# Patient Record
Sex: FEMALE | Race: BLACK OR AFRICAN AMERICAN | Employment: UNEMPLOYED | URBAN - METROPOLITAN AREA
[De-identification: names, ages, dates, MRNs, and addresses within clinical notes are randomized per-mention and may not be internally consistent; named-entity substitution may affect disease eponyms.]

---

## 2017-06-01 PROBLEM — K80.43 CHOLEDOCHOLITHIASIS WITH ACUTE CHOLECYSTITIS WITH OBSTRUCTION: Status: ACTIVE | Noted: 2017-06-01

## 2018-05-19 ENCOUNTER — HOSPITAL ENCOUNTER (INPATIENT)
Age: 23
LOS: 2 days | Discharge: HOME | DRG: 560 | End: 2018-05-21
Attending: OBSTETRICS & GYNECOLOGY | Admitting: OBSTETRICS & GYNECOLOGY
Payer: MEDICAID

## 2018-05-19 DIAGNOSIS — G89.18 ACUTE POST-OPERATIVE PAIN: Primary | ICD-10-CM

## 2018-05-19 PROBLEM — Z3A.40 40 WEEKS GESTATION OF PREGNANCY: Status: ACTIVE | Noted: 2018-05-19

## 2018-05-19 LAB
ABO/RH: NORMAL
AMPHETAMINE SCREEN, URINE: NOT DETECTED
ANTIBODY SCREEN: NORMAL
BARBITURATE SCREEN URINE: NOT DETECTED
BENZODIAZEPINE SCREEN, URINE: NOT DETECTED
CANNABINOID SCREEN URINE: NOT DETECTED
COCAINE METABOLITE SCREEN URINE: NOT DETECTED
HCT VFR BLD CALC: 33 % (ref 34–48)
HEMOGLOBIN: 10.3 G/DL (ref 11.5–15.5)
MCH RBC QN AUTO: 20.6 PG (ref 26–35)
MCHC RBC AUTO-ENTMCNC: 31.2 % (ref 32–34.5)
MCV RBC AUTO: 66.1 FL (ref 80–99.9)
METHADONE SCREEN, URINE: NOT DETECTED
OPIATE SCREEN URINE: NOT DETECTED
PDW BLD-RTO: 17.2 FL (ref 11.5–15)
PHENCYCLIDINE SCREEN URINE: NOT DETECTED
PLATELET # BLD: 316 E9/L (ref 130–450)
PMV BLD AUTO: 11 FL (ref 7–12)
PROPOXYPHENE SCREEN: NOT DETECTED
RBC # BLD: 4.99 E12/L (ref 3.5–5.5)
WBC # BLD: 14.4 E9/L (ref 4.5–11.5)

## 2018-05-19 PROCEDURE — 36415 COLL VENOUS BLD VENIPUNCTURE: CPT

## 2018-05-19 PROCEDURE — 6370000000 HC RX 637 (ALT 250 FOR IP): Performed by: OBSTETRICS & GYNECOLOGY

## 2018-05-19 PROCEDURE — 0UJHXZZ INSPECTION OF VAGINA AND CUL-DE-SAC, EXTERNAL APPROACH: ICD-10-PCS | Performed by: OBSTETRICS & GYNECOLOGY

## 2018-05-19 PROCEDURE — 86901 BLOOD TYPING SEROLOGIC RH(D): CPT

## 2018-05-19 PROCEDURE — 0UJMXZZ INSPECTION OF VULVA, EXTERNAL APPROACH: ICD-10-PCS | Performed by: OBSTETRICS & GYNECOLOGY

## 2018-05-19 PROCEDURE — 1220000000 HC SEMI PRIVATE OB R&B

## 2018-05-19 PROCEDURE — 1710000000 HC NURSERY LEVEL I R&B

## 2018-05-19 PROCEDURE — 86592 SYPHILIS TEST NON-TREP QUAL: CPT

## 2018-05-19 PROCEDURE — 87340 HEPATITIS B SURFACE AG IA: CPT

## 2018-05-19 PROCEDURE — 86850 RBC ANTIBODY SCREEN: CPT

## 2018-05-19 PROCEDURE — 6360000002 HC RX W HCPCS: Performed by: OBSTETRICS & GYNECOLOGY

## 2018-05-19 PROCEDURE — 86900 BLOOD TYPING SEROLOGIC ABO: CPT

## 2018-05-19 PROCEDURE — 85027 COMPLETE CBC AUTOMATED: CPT

## 2018-05-19 PROCEDURE — 7200000001 HC VAGINAL DELIVERY

## 2018-05-19 PROCEDURE — 86703 HIV-1/HIV-2 1 RESULT ANTBDY: CPT

## 2018-05-19 PROCEDURE — 6360000002 HC RX W HCPCS

## 2018-05-19 PROCEDURE — 80307 DRUG TEST PRSMV CHEM ANLYZR: CPT

## 2018-05-19 RX ORDER — ERYTHROMYCIN 5 MG/G
OINTMENT OPHTHALMIC
Status: DISPENSED
Start: 2018-05-19 | End: 2018-05-19

## 2018-05-19 RX ORDER — SODIUM CHLORIDE 0.9 % (FLUSH) 0.9 %
10 SYRINGE (ML) INJECTION EVERY 12 HOURS SCHEDULED
Status: DISCONTINUED | OUTPATIENT
Start: 2018-05-19 | End: 2018-05-19

## 2018-05-19 RX ORDER — SODIUM CHLORIDE, SODIUM LACTATE, POTASSIUM CHLORIDE, CALCIUM CHLORIDE 600; 310; 30; 20 MG/100ML; MG/100ML; MG/100ML; MG/100ML
INJECTION, SOLUTION INTRAVENOUS CONTINUOUS
Status: DISCONTINUED | OUTPATIENT
Start: 2018-05-19 | End: 2018-05-19

## 2018-05-19 RX ORDER — OXYTOCIN 10 [USP'U]/ML
10 INJECTION, SOLUTION INTRAMUSCULAR; INTRAVENOUS ONCE
Status: COMPLETED | OUTPATIENT
Start: 2018-05-19 | End: 2018-05-19

## 2018-05-19 RX ORDER — DOCUSATE SODIUM 100 MG/1
100 CAPSULE, LIQUID FILLED ORAL 2 TIMES DAILY
Status: DISCONTINUED | OUTPATIENT
Start: 2018-05-19 | End: 2018-05-21 | Stop reason: HOSPADM

## 2018-05-19 RX ORDER — ACETAMINOPHEN 325 MG/1
650 TABLET ORAL EVERY 4 HOURS PRN
Status: DISCONTINUED | OUTPATIENT
Start: 2018-05-19 | End: 2018-05-19

## 2018-05-19 RX ORDER — OXYTOCIN 10 [USP'U]/ML
INJECTION, SOLUTION INTRAMUSCULAR; INTRAVENOUS
Status: COMPLETED
Start: 2018-05-19 | End: 2018-05-19

## 2018-05-19 RX ORDER — LANOLIN 100 %
OINTMENT (GRAM) TOPICAL PRN
Status: DISCONTINUED | OUTPATIENT
Start: 2018-05-19 | End: 2018-05-21 | Stop reason: HOSPADM

## 2018-05-19 RX ORDER — PHYTONADIONE 1 MG/.5ML
INJECTION, EMULSION INTRAMUSCULAR; INTRAVENOUS; SUBCUTANEOUS
Status: DISPENSED
Start: 2018-05-19 | End: 2018-05-19

## 2018-05-19 RX ORDER — SODIUM CHLORIDE 0.9 % (FLUSH) 0.9 %
10 SYRINGE (ML) INJECTION PRN
Status: DISCONTINUED | OUTPATIENT
Start: 2018-05-19 | End: 2018-05-21 | Stop reason: HOSPADM

## 2018-05-19 RX ORDER — TRAMADOL HYDROCHLORIDE 50 MG/1
50 TABLET ORAL EVERY 6 HOURS PRN
Status: DISCONTINUED | OUTPATIENT
Start: 2018-05-19 | End: 2018-05-21 | Stop reason: HOSPADM

## 2018-05-19 RX ADMIN — BUTORPHANOL TARTRATE 2 MG: 2 INJECTION, SOLUTION INTRAMUSCULAR; INTRAVENOUS at 09:34

## 2018-05-19 RX ADMIN — OXYTOCIN 10 UNITS: 10 INJECTION, SOLUTION INTRAMUSCULAR; INTRAVENOUS at 09:15

## 2018-05-19 RX ADMIN — TRAMADOL HYDROCHLORIDE 50 MG: 50 TABLET, FILM COATED ORAL at 19:56

## 2018-05-19 RX ADMIN — DOCUSATE SODIUM 100 MG: 100 CAPSULE, LIQUID FILLED ORAL at 19:56

## 2018-05-19 RX ADMIN — TRAMADOL HYDROCHLORIDE 50 MG: 50 TABLET, FILM COATED ORAL at 13:50

## 2018-05-19 ASSESSMENT — PAIN DESCRIPTION - PAIN TYPE: TYPE: ACUTE PAIN

## 2018-05-19 ASSESSMENT — PAIN DESCRIPTION - FREQUENCY: FREQUENCY: INTERMITTENT

## 2018-05-19 ASSESSMENT — PAIN SCALES - GENERAL
PAINLEVEL_OUTOF10: 5
PAINLEVEL_OUTOF10: 8
PAINLEVEL_OUTOF10: 2
PAINLEVEL_OUTOF10: 7

## 2018-05-19 ASSESSMENT — PAIN DESCRIPTION - DESCRIPTORS: DESCRIPTORS: CRAMPING

## 2018-05-19 ASSESSMENT — PAIN DESCRIPTION - LOCATION: LOCATION: ABDOMEN

## 2018-05-19 ASSESSMENT — PAIN DESCRIPTION - ORIENTATION: ORIENTATION: MID;LOWER

## 2018-05-20 PROCEDURE — 1710000000 HC NURSERY LEVEL I R&B

## 2018-05-20 PROCEDURE — 1220000000 HC SEMI PRIVATE OB R&B

## 2018-05-20 PROCEDURE — 6370000000 HC RX 637 (ALT 250 FOR IP): Performed by: OBSTETRICS & GYNECOLOGY

## 2018-05-20 RX ORDER — TRAMADOL HYDROCHLORIDE 50 MG/1
50 TABLET ORAL EVERY 6 HOURS PRN
Qty: 24 TABLET | Refills: 0 | Status: SHIPPED | OUTPATIENT
Start: 2018-05-20 | End: 2018-05-30

## 2018-05-20 RX ADMIN — TRAMADOL HYDROCHLORIDE 50 MG: 50 TABLET, FILM COATED ORAL at 01:52

## 2018-05-20 RX ADMIN — TRAMADOL HYDROCHLORIDE 50 MG: 50 TABLET, FILM COATED ORAL at 16:19

## 2018-05-20 RX ADMIN — TRAMADOL HYDROCHLORIDE 50 MG: 50 TABLET, FILM COATED ORAL at 08:08

## 2018-05-20 RX ADMIN — DOCUSATE SODIUM 100 MG: 100 CAPSULE, LIQUID FILLED ORAL at 08:09

## 2018-05-20 RX ADMIN — DOCUSATE SODIUM 100 MG: 100 CAPSULE, LIQUID FILLED ORAL at 21:34

## 2018-05-20 RX ADMIN — TRAMADOL HYDROCHLORIDE 50 MG: 50 TABLET, FILM COATED ORAL at 22:14

## 2018-05-20 ASSESSMENT — PAIN DESCRIPTION - DESCRIPTORS
DESCRIPTORS: CRAMPING;DISCOMFORT
DESCRIPTORS: CRAMPING

## 2018-05-20 ASSESSMENT — PAIN DESCRIPTION - LOCATION
LOCATION: ABDOMEN
LOCATION: ABDOMEN

## 2018-05-20 ASSESSMENT — PAIN DESCRIPTION - PROGRESSION
CLINICAL_PROGRESSION: GRADUALLY WORSENING
CLINICAL_PROGRESSION: NOT CHANGED

## 2018-05-20 ASSESSMENT — PAIN DESCRIPTION - FREQUENCY: FREQUENCY: INTERMITTENT

## 2018-05-20 ASSESSMENT — PAIN SCALES - GENERAL
PAINLEVEL_OUTOF10: 0
PAINLEVEL_OUTOF10: 6
PAINLEVEL_OUTOF10: 6
PAINLEVEL_OUTOF10: 5
PAINLEVEL_OUTOF10: 7
PAINLEVEL_OUTOF10: 4

## 2018-05-20 ASSESSMENT — PAIN DESCRIPTION - ONSET: ONSET: GRADUAL

## 2018-05-20 ASSESSMENT — PAIN DESCRIPTION - PAIN TYPE
TYPE: ACUTE PAIN
TYPE: ACUTE PAIN

## 2018-05-20 ASSESSMENT — PAIN DESCRIPTION - ORIENTATION: ORIENTATION: LOWER;MID

## 2018-05-21 ENCOUNTER — PREP FOR PROCEDURE (OUTPATIENT)
Dept: OBGYN | Age: 23
End: 2018-05-21

## 2018-05-21 VITALS
HEART RATE: 110 BPM | BODY MASS INDEX: 33.04 KG/M2 | SYSTOLIC BLOOD PRESSURE: 122 MMHG | WEIGHT: 175 LBS | DIASTOLIC BLOOD PRESSURE: 70 MMHG | HEIGHT: 61 IN | TEMPERATURE: 98.5 F | RESPIRATION RATE: 14 BRPM

## 2018-05-21 LAB
HEPATITIS B SURFACE ANTIGEN INTERPRETATION: NORMAL
HIV-1 AND HIV-2 ANTIBODIES: NORMAL
RPR: NORMAL

## 2018-05-21 PROCEDURE — 6370000000 HC RX 637 (ALT 250 FOR IP): Performed by: OBSTETRICS & GYNECOLOGY

## 2018-05-21 RX ADMIN — DOCUSATE SODIUM 100 MG: 100 CAPSULE, LIQUID FILLED ORAL at 08:14

## 2018-05-21 RX ADMIN — TRAMADOL HYDROCHLORIDE 50 MG: 50 TABLET, FILM COATED ORAL at 10:53

## 2018-05-21 RX ADMIN — TRAMADOL HYDROCHLORIDE 50 MG: 50 TABLET, FILM COATED ORAL at 16:17

## 2018-05-21 RX ADMIN — TRAMADOL HYDROCHLORIDE 50 MG: 50 TABLET, FILM COATED ORAL at 04:35

## 2018-05-21 ASSESSMENT — PAIN SCALES - GENERAL
PAINLEVEL_OUTOF10: 8
PAINLEVEL_OUTOF10: 10
PAINLEVEL_OUTOF10: 6
PAINLEVEL_OUTOF10: 2

## 2019-01-18 ENCOUNTER — HOSPITAL ENCOUNTER (OUTPATIENT)
Age: 24
Discharge: HOME | End: 2019-01-18
Attending: OBSTETRICS & GYNECOLOGY | Admitting: OBSTETRICS & GYNECOLOGY
Payer: MEDICAID

## 2019-01-18 VITALS
BODY MASS INDEX: 30.36 KG/M2 | DIASTOLIC BLOOD PRESSURE: 55 MMHG | WEIGHT: 165 LBS | RESPIRATION RATE: 16 BRPM | TEMPERATURE: 97.5 F | HEIGHT: 62 IN | HEART RATE: 93 BPM | SYSTOLIC BLOOD PRESSURE: 112 MMHG

## 2019-01-18 PROBLEM — K80.43 CHOLEDOCHOLITHIASIS WITH ACUTE CHOLECYSTITIS WITH OBSTRUCTION: Status: RESOLVED | Noted: 2017-06-01 | Resolved: 2019-01-18

## 2019-01-18 PROBLEM — Z64.1 GRAND MULTIPARA: Status: ACTIVE | Noted: 2019-01-18

## 2019-01-18 PROBLEM — R10.9 ABDOMINAL PAIN AFFECTING PREGNANCY: Status: ACTIVE | Noted: 2019-01-18

## 2019-01-18 PROBLEM — R10.2 SUPRAPUBIC ABDOMINAL PAIN: Status: ACTIVE | Noted: 2019-01-18

## 2019-01-18 PROBLEM — Z3A.40 40 WEEKS GESTATION OF PREGNANCY: Status: RESOLVED | Noted: 2018-05-19 | Resolved: 2019-01-18

## 2019-01-18 PROBLEM — R31.21 ASYMPTOMATIC MICROSCOPIC HEMATURIA: Status: ACTIVE | Noted: 2019-01-18

## 2019-01-18 PROBLEM — O26.899 ABDOMINAL PAIN AFFECTING PREGNANCY: Status: ACTIVE | Noted: 2019-01-18

## 2019-01-18 LAB
ABO/RH: NORMAL
ANTIBODY SCREEN: NORMAL
BACTERIA: ABNORMAL /HPF
BASOPHILS ABSOLUTE: 0.04 E9/L (ref 0–0.2)
BASOPHILS RELATIVE PERCENT: 0.3 % (ref 0–2)
BILIRUBIN URINE: NEGATIVE
BLOOD, URINE: ABNORMAL
CLARITY: CLEAR
COLOR: YELLOW
EOSINOPHILS ABSOLUTE: 0.04 E9/L (ref 0.05–0.5)
EOSINOPHILS RELATIVE PERCENT: 0.3 % (ref 0–6)
FERRITIN: 10 NG/ML
GLUCOSE URINE: NEGATIVE MG/DL
HCT VFR BLD CALC: 31.6 % (ref 34–48)
HEMOGLOBIN: 10.2 G/DL (ref 11.5–15.5)
IMMATURE GRANULOCYTES #: 0.17 E9/L
IMMATURE GRANULOCYTES %: 1.3 % (ref 0–5)
KETONES, URINE: NEGATIVE MG/DL
LEUKOCYTE ESTERASE, URINE: NEGATIVE
LYMPHOCYTES ABSOLUTE: 2.32 E9/L (ref 1.5–4)
LYMPHOCYTES RELATIVE PERCENT: 17.1 % (ref 20–42)
MCH RBC QN AUTO: 22.3 PG (ref 26–35)
MCHC RBC AUTO-ENTMCNC: 32.3 % (ref 32–34.5)
MCV RBC AUTO: 69 FL (ref 80–99.9)
MONOCYTES ABSOLUTE: 0.82 E9/L (ref 0.1–0.95)
MONOCYTES RELATIVE PERCENT: 6.1 % (ref 2–12)
MUCUS: PRESENT
NEUTROPHILS ABSOLUTE: 10.15 E9/L (ref 1.8–7.3)
NEUTROPHILS RELATIVE PERCENT: 74.9 % (ref 43–80)
NITRITE, URINE: NEGATIVE
PDW BLD-RTO: 15.3 FL (ref 11.5–15)
PH UA: 6.5 (ref 5–9)
PLATELET # BLD: 318 E9/L (ref 130–450)
PMV BLD AUTO: 10.7 FL (ref 7–12)
PROTEIN UA: NEGATIVE MG/DL
RAPID HIV 1&2: NORMAL
RBC # BLD: 4.58 E12/L (ref 3.5–5.5)
RBC UA: ABNORMAL /HPF (ref 0–2)
SPECIFIC GRAVITY UA: 1.02 (ref 1–1.03)
UROBILINOGEN, URINE: 0.2 E.U./DL
WBC # BLD: 13.5 E9/L (ref 4.5–11.5)
WBC UA: ABNORMAL /HPF (ref 0–5)

## 2019-01-18 PROCEDURE — 76817 TRANSVAGINAL US OBSTETRIC: CPT | Performed by: OBSTETRICS & GYNECOLOGY

## 2019-01-18 PROCEDURE — 86900 BLOOD TYPING SEROLOGIC ABO: CPT

## 2019-01-18 PROCEDURE — 99202 OFFICE O/P NEW SF 15 MIN: CPT

## 2019-01-18 PROCEDURE — 76817 TRANSVAGINAL US OBSTETRIC: CPT

## 2019-01-18 PROCEDURE — 82728 ASSAY OF FERRITIN: CPT

## 2019-01-18 PROCEDURE — 76811 OB US DETAILED SNGL FETUS: CPT | Performed by: OBSTETRICS & GYNECOLOGY

## 2019-01-18 PROCEDURE — 86701 HIV-1ANTIBODY: CPT

## 2019-01-18 PROCEDURE — 87491 CHLMYD TRACH DNA AMP PROBE: CPT

## 2019-01-18 PROCEDURE — 87340 HEPATITIS B SURFACE AG IA: CPT

## 2019-01-18 PROCEDURE — 86901 BLOOD TYPING SEROLOGIC RH(D): CPT

## 2019-01-18 PROCEDURE — 81001 URINALYSIS AUTO W/SCOPE: CPT

## 2019-01-18 PROCEDURE — 76811 OB US DETAILED SNGL FETUS: CPT

## 2019-01-18 PROCEDURE — 86592 SYPHILIS TEST NON-TREP QUAL: CPT

## 2019-01-18 PROCEDURE — 86850 RBC ANTIBODY SCREEN: CPT

## 2019-01-18 PROCEDURE — 99243 OFF/OP CNSLTJ NEW/EST LOW 30: CPT | Performed by: OBSTETRICS & GYNECOLOGY

## 2019-01-18 PROCEDURE — 36415 COLL VENOUS BLD VENIPUNCTURE: CPT

## 2019-01-18 PROCEDURE — 99213 OFFICE O/P EST LOW 20 MIN: CPT | Performed by: NURSE PRACTITIONER

## 2019-01-18 PROCEDURE — 87591 N.GONORRHOEAE DNA AMP PROB: CPT

## 2019-01-18 PROCEDURE — 85025 COMPLETE CBC W/AUTO DIFF WBC: CPT

## 2019-01-18 PROCEDURE — 86803 HEPATITIS C AB TEST: CPT

## 2019-01-18 RX ORDER — SODIUM CHLORIDE, SODIUM LACTATE, POTASSIUM CHLORIDE, CALCIUM CHLORIDE 600; 310; 30; 20 MG/100ML; MG/100ML; MG/100ML; MG/100ML
INJECTION, SOLUTION INTRAVENOUS CONTINUOUS
Status: DISCONTINUED | OUTPATIENT
Start: 2019-01-18 | End: 2019-01-18 | Stop reason: ALTCHOICE

## 2019-01-18 RX ORDER — SODIUM CHLORIDE, SODIUM LACTATE, POTASSIUM CHLORIDE, AND CALCIUM CHLORIDE .6; .31; .03; .02 G/100ML; G/100ML; G/100ML; G/100ML
500 INJECTION, SOLUTION INTRAVENOUS ONCE
Status: DISCONTINUED | OUTPATIENT
Start: 2019-01-18 | End: 2019-01-18 | Stop reason: ALTCHOICE

## 2019-01-21 LAB
HEPATITIS B SURFACE ANTIGEN INTERPRETATION: NORMAL
HEPATITIS C ANTIBODY INTERPRETATION: NORMAL
RPR: NORMAL

## 2019-01-25 LAB
CHLAMYDIA TRACHOMATIS AMPLIFIED DET: NORMAL
N GONORRHOEAE AMPLIFIED DET: NORMAL

## 2019-03-21 ENCOUNTER — HOSPITAL ENCOUNTER (EMERGENCY)
Age: 24
Discharge: HOME | End: 2019-03-21
Payer: MEDICAID

## 2019-03-21 VITALS
DIASTOLIC BLOOD PRESSURE: 62 MMHG | TEMPERATURE: 98.4 F | OXYGEN SATURATION: 99 % | RESPIRATION RATE: 14 BRPM | WEIGHT: 168 LBS | SYSTOLIC BLOOD PRESSURE: 108 MMHG | HEIGHT: 61 IN | HEART RATE: 92 BPM | BODY MASS INDEX: 31.72 KG/M2

## 2019-03-21 DIAGNOSIS — J06.9 ACUTE UPPER RESPIRATORY INFECTION: Primary | ICD-10-CM

## 2019-03-21 LAB
BACTERIA: ABNORMAL /HPF
BILIRUBIN URINE: NEGATIVE
BLOOD, URINE: ABNORMAL
CLARITY: CLEAR
COLOR: YELLOW
EPITHELIAL CELLS, UA: ABNORMAL /HPF
GLUCOSE URINE: NEGATIVE MG/DL
INFLUENZA A BY PCR: NOT DETECTED
INFLUENZA B BY PCR: NOT DETECTED
KETONES, URINE: NEGATIVE MG/DL
LEUKOCYTE ESTERASE, URINE: ABNORMAL
NITRITE, URINE: NEGATIVE
PH UA: 6 (ref 5–9)
PROTEIN UA: NEGATIVE MG/DL
RBC UA: ABNORMAL /HPF (ref 0–2)
SPECIFIC GRAVITY UA: <=1.005 (ref 1–1.03)
UROBILINOGEN, URINE: 0.2 E.U./DL
WBC UA: ABNORMAL /HPF (ref 0–5)

## 2019-03-21 PROCEDURE — 99283 EMERGENCY DEPT VISIT LOW MDM: CPT

## 2019-03-21 PROCEDURE — 81001 URINALYSIS AUTO W/SCOPE: CPT

## 2019-03-21 PROCEDURE — 87502 INFLUENZA DNA AMP PROBE: CPT

## 2019-03-21 RX ORDER — CALCIUM CARBONATE 300MG(750)
1 TABLET,CHEWABLE ORAL DAILY
Qty: 30 TABLET | Refills: 0 | Status: SHIPPED | OUTPATIENT
Start: 2019-03-21

## 2019-03-21 ASSESSMENT — PAIN DESCRIPTION - PAIN TYPE: TYPE: ACUTE PAIN

## 2019-03-21 ASSESSMENT — PAIN DESCRIPTION - LOCATION: LOCATION: GENERALIZED

## 2019-03-21 ASSESSMENT — PAIN SCALES - GENERAL: PAINLEVEL_OUTOF10: 8

## 2019-04-29 ENCOUNTER — HOSPITAL ENCOUNTER (OUTPATIENT)
Age: 24
Setting detail: OBSERVATION
Discharge: HOME | End: 2019-04-30
Attending: OBSTETRICS & GYNECOLOGY | Admitting: OBSTETRICS & GYNECOLOGY
Payer: MEDICAID

## 2019-04-29 PROBLEM — O09.30 POOR PATIENT ATTENDANCE OF ANTENATAL CARE: Status: ACTIVE | Noted: 2019-04-29

## 2019-04-29 PROBLEM — O46.93 VAGINAL BLEEDING IN PREGNANCY, THIRD TRIMESTER: Status: ACTIVE | Noted: 2019-04-29

## 2019-04-29 LAB
ABO/RH: NORMAL
ANTIBODY SCREEN: NORMAL
HCT VFR BLD CALC: 29.7 % (ref 34–48)
HEMOGLOBIN: 9.2 G/DL (ref 11.5–15.5)
MCH RBC QN AUTO: 20 PG (ref 26–35)
MCHC RBC AUTO-ENTMCNC: 31 % (ref 32–34.5)
MCV RBC AUTO: 64.4 FL (ref 80–99.9)
PDW BLD-RTO: 17.3 FL (ref 11.5–15)
PLATELET # BLD: 262 E9/L (ref 130–450)
PMV BLD AUTO: 10.2 FL (ref 7–12)
RBC # BLD: 4.61 E12/L (ref 3.5–5.5)
WBC # BLD: 11.6 E9/L (ref 4.5–11.5)

## 2019-04-29 PROCEDURE — 86850 RBC ANTIBODY SCREEN: CPT

## 2019-04-29 PROCEDURE — 87340 HEPATITIS B SURFACE AG IA: CPT

## 2019-04-29 PROCEDURE — 87491 CHLMYD TRACH DNA AMP PROBE: CPT

## 2019-04-29 PROCEDURE — 76820 UMBILICAL ARTERY ECHO: CPT | Performed by: OBSTETRICS & GYNECOLOGY

## 2019-04-29 PROCEDURE — 36415 COLL VENOUS BLD VENIPUNCTURE: CPT

## 2019-04-29 PROCEDURE — 76819 FETAL BIOPHYS PROFIL W/O NST: CPT

## 2019-04-29 PROCEDURE — G0378 HOSPITAL OBSERVATION PER HR: HCPCS

## 2019-04-29 PROCEDURE — 76819 FETAL BIOPHYS PROFIL W/O NST: CPT | Performed by: OBSTETRICS & GYNECOLOGY

## 2019-04-29 PROCEDURE — 86901 BLOOD TYPING SEROLOGIC RH(D): CPT

## 2019-04-29 PROCEDURE — 76821 MIDDLE CEREBRAL ARTERY ECHO: CPT | Performed by: OBSTETRICS & GYNECOLOGY

## 2019-04-29 PROCEDURE — 86703 HIV-1/HIV-2 1 RESULT ANTBDY: CPT

## 2019-04-29 PROCEDURE — 76805 OB US >/= 14 WKS SNGL FETUS: CPT

## 2019-04-29 PROCEDURE — 99214 OFFICE O/P EST MOD 30 MIN: CPT | Performed by: PHYSICIAN ASSISTANT

## 2019-04-29 PROCEDURE — 86762 RUBELLA ANTIBODY: CPT

## 2019-04-29 PROCEDURE — 76820 UMBILICAL ARTERY ECHO: CPT

## 2019-04-29 PROCEDURE — 76821 MIDDLE CEREBRAL ARTERY ECHO: CPT

## 2019-04-29 PROCEDURE — 76805 OB US >/= 14 WKS SNGL FETUS: CPT | Performed by: OBSTETRICS & GYNECOLOGY

## 2019-04-29 PROCEDURE — 86900 BLOOD TYPING SEROLOGIC ABO: CPT

## 2019-04-29 PROCEDURE — 87591 N.GONORRHOEAE DNA AMP PROB: CPT

## 2019-04-29 PROCEDURE — 85027 COMPLETE CBC AUTOMATED: CPT

## 2019-04-29 PROCEDURE — 86592 SYPHILIS TEST NON-TREP QUAL: CPT

## 2019-04-29 PROCEDURE — 99213 OFFICE O/P EST LOW 20 MIN: CPT | Performed by: MIDWIFE

## 2019-04-29 PROCEDURE — 99243 OFF/OP CNSLTJ NEW/EST LOW 30: CPT | Performed by: OBSTETRICS & GYNECOLOGY

## 2019-04-29 PROCEDURE — 87081 CULTURE SCREEN ONLY: CPT

## 2019-04-29 NOTE — H&P
Department of Obstetrics and Gynecology  Physician Assistant Obstetrics History and Physical      HISTORY OF PRESENT ILLNESS:      The patient is a 21 y.o.  6 parity 5 at 42 weeks' 4 days' gestation presents to the antepartal floor c/o cramping and vaginal bleeding. She reports when she went to wipe after urinating, she had approximately a teaspoon's worth of blood. No bleeding since. She has not had any prenatal care. She denies recent sexual intercourse, trauma. Current obstetric history is significant for:  No prenatal care  5 term 's  Precipitous delivery with 5th/last pregnancy    Estimated Due Date:  2019  Contractions: Occasional  Leaking of fluid: No  Bleeding:  Yes  Perceived fetal movement: Good    Group B Strep: no prenatal care      PAST OB HISTORY:  OB History    Para Term  AB Living   6 5 5     5   SAB TAB Ectopic Molar Multiple Live Births           0 5      # Outcome Date GA Lbr Cortez/2nd Weight Sex Delivery Anes PTL Lv   6 Current            5 Term 18 39w6d  7 lb 6.5 oz (3.36 kg) F Vag-Spont None N DAVID      Complications: Precipitous Labor   4 Term 17 38w5d  7 lb 15 oz (3.6 kg) M Vag-Spont EPI N DAVID   3 Term 03/26/15     Vag-Spont  N DAVID   2 Term 14 39w0d  7 lb 8 oz (3.402 kg) M Vag-Spont EPI N DAVID   1 Term 02/17/10 39w0d  7 lb 2 oz (3.232 kg) M Vag-Spont EPI N DAVID           Pre-eclampsia:  No      :  No      D & C:  No      Cerclage:  No      LEEP:  No      Myomectomy:  No       Labor: No    Past Medical History:    Diagnosis Date    Anemia     Asthma     childhood    Mental disorder     depression and anxiety    Postpartum depression         Past Surgical History:    Procedure Laterality Date    CHOLECYSTECTOMY  2017    Dr. Ortiz Lemons    ERCP  2017    Dr. Alexander Wu        Social History:     reports that she has been smoking cigarettes. She has been smoking about 0.25 packs per day.  She has never used smokeless tobacco.

## 2019-04-29 NOTE — CONSULTS
19    RE:  Wil Rhodes  : 1995   AGE: 21 y. o.     This report has been created using voice recognition software. It may contain errors which are inherent in voice recognition technology.     Dear Dr. Gloria Pennington:     I saw your patient Gregory Carpio today for the following indications:     Patient Active Problem List   Diagnosis    Abdominal pain affecting pregnancy    No prenatal care in current pregnancy in second trimester    Suprapubic abdominal pain    Asymptomatic microscopic hematuria    Grand multipara      As you know, your patient is a 21 y.o. female, who is G6(5,0,0,5). She has an Estimated Date of Delivery: 19 based on her previous ultrasound assessment, on 2019. She stated that her last menstrual dating parameters were uncertain. She is currently 35 weeks 6 days gestation based on the previous assessment. The patient is had limited prenatal care. Her last physician evaluation was 2019 at the time of her evaluation in the labor and delivery unit.     The patient was admitted to the labor and delivery unit for evaluation and management secondary to complaint of abdominal cramping and vaginal bleeding less than a period. She is had no leaking fluid, fever, chills, dysuria or hematuria. Her fetal movement has been good. She had no history of abdominal trauma or recent sexual activity. The fetal heart rate tracing is reassuring with moderate variability and accelerations. An occasional variable deceleration was noted. Irregular uterine contractions were present. The patient has hypochromic, microcytic anemia with a low ferritin level, consistent with iron deficiency anemia. The patient is a cigarette smoker. I advised the patient of the importance of discontinuing her smoking activity. She was advised of the increased risk of  morbidity and mortality associated with this activity.  This includes, but is not limited to; the increased for  labor and delivery,  rupture of membranes, placental abruption, intrauterine growth restriction and intrauterine death. I also advised her of the significant increased risk of sudden infant death syndrome in individuals that smoke.      The patient is had 5 previous vaginal deliveries without complications. She delivered her most recent pregnancy at home The patient has had 5 previous deliveries. She is at increased risk of  morbidity and mortality secondary to grand-multiparity. She is at a significant risk of post partum hemorrhage from uterine atony.      A fetal ultrasound assessment was performed today. The estimated fetal weight is at the 61st%. The SANTO is 13.2 cm. The BPP and cord Doppler studies were both reassuring.  No apparent gross fetal anatomic abnormalities have been identified, however visualization is somewhat limited secondary to the advanced gestational age of the fetus and the fetal position.                                GENETIC SCREENING/TERATOLOGY COUNSELING                            (Includes patient, FTB, and any affected family members)     Patient Age > 35 Years NO   Thalassemia ( MVC<80) NO   Congential Heart Defect NO   Neural Tube Defect NO   Brandon-Sachs NO   Sickle Cell Disease NO   Sickle Cell Trait NO   Sickle C Disease or Trait NO   Hemophilia NO   Muscular Dystrophy NO   Cystic Fibrosis NO   Rusk Disease NO   Autism NO   Mental Retardation NO   History of Fragile X NO   Maternal Diabetes NO   Other Genetic Disease or Syndrome NO   Previous Child With Congenital Abnormality Not Listed NO   Recreational Drugs NO                                                INFECTION HISTORY          HEPATITIS IMMUNIZED:  YES    HEPATITIS INFECTION:  NO    EXPOSURE TO TB NO    GENITAL HERPES    NO    PARVOVIRUS B-19 NO    CHICKEN POX  NO    MEASLES NO    STD NO    HIV NO    OTHER RASH OR VIRAL ILLNESS SINCE LMP NO    UTI RECURRENT NO   HPV NO      OB History  Para Term  AB Living   6 5 5     5   SAB TAB Ectopic Molar Multiple Live Births           0 5       # Outcome Date GA Lbr Cortez/2nd Weight Sex Delivery Anes PTL Lv   6 Current                     5 Term 18 39w6d   7 lb 6.5 oz (3.36 kg) F Vag-Spont None N DAVID      Complications: Precipitous Labor   4 Term 17 38w5d   7 lb 15 oz (3.6 kg) M Vag-Spont EPI N DAVID   3 Term 03/26/15         Vag-Spont   N DAVID   2 Term 14 39w0d   7 lb 8 oz (3.402 kg) M Vag-Spont EPI N DAVID   1 Term 02/17/10 39w0d   7 lb 2 oz (3.232 kg) M Vag-Spont EPI N DAVID      PAST GYNECOLOGICAL  HISTORY:  Positive for abnormal pap smears. Doesn't know the grade. Negative for sexually transmitted diseases. Negative for cervical LEEP / conization /cryosurgery. Negative for uterine surgery.    Negative for ovarian or tubal surgery.      Past Medical History:   Diagnosis Date    Anemia      Asthma       childhood    Mental disorder       depression and anxiety    Postpartum depression              Past Surgical History:   Procedure Laterality Date    CHOLECYSTECTOMY   2017     Dr. Chapin Slight    ERCP   2017     Dr. Ember Barrera            Allergies   Allergen Reactions    Motrin [Ibuprofen] Hives       Pt states allergy is to over the counter only    Tylenol [Acetaminophen] Hives       Pt states allergy is to over the counter tylenol only      Current Medication   No current facility-administered medications for this encounter.         Social History   Substance Use Topics    Smoking status: Current Every Day Smoker       Packs/day: 0.25       Types: Cigarettes    Smokeless tobacco: Never Used    Alcohol use No      FAMILY MEDICAL HISTORY:   Negative for congenital abnormalities, autism, genetic disease and mental retardation, not listed above.      Review of Systems :   CONSTITUTIONAL : No fever, no chills   HEENT : No headache, no visual changes, no rhinorrhea, no sore throat   CARDIOVASCULAR : No pain, no palpitations, no edema   RESPIRATORY : No pain, no shortness of breath   GASTROINTESTINAL : No N/V, no D/C, Suprapubic abdominal pain   GENITOURINARY : No dysuria, hematuria and no incontinence   MUSCULOSKELETAL : No myalgia, No back pain  NEUROLOGICAL : No numbness, no tingling, no tremors. No history of seizures  ALL OTHER SYSTEMS WERE REPORTED AS NEGATIVE.     PERTINENT PHYSICAL EXAMINATION:   Patient Vitals for the past 24 hrs:   Temp Temp [facility]   04/29/19 1548 98.1 °F (36.7 °C) Oral      GENERAL:   The patient is a well developed, female who is alert cooperative and oriented times three in no acute distress.     HEENT:  Normo cephalic and atraumatic. No facial edema.      ABDOMEN:   Her uterus is gravid. She had no complaint of abdominal pain or tenderness. The fetal heart rate is 140 bpm. The fetus is in the cephalic presentation which was confirmed by the ultrasound assessment.     EXTREMITIES:  No peripheral edema is noted.      PELVIC EXAMINATION:  Not repeated. The original evaluation performed by Brittni Ridley,       A fetal ultrasound assessment was performed today. A report is enclosed for your review.      IMPRESSION:    1.  IUP at 35 weeks 6 days gestation based on her Estimated Date of Delivery: 5/28/19    2. Limited prenatal care    3. [address] multiparity    4. Previous ultrasound 1/18/2019. LMP dates were uncertain. 6.  Reactive NST with occasional variables. 7.  No apparent gross fetal anatomic abnormalities, with limited visualization as noted above. 8.  Elevated cord Doppler SD ratios, without absence or reversal of end-diastolic flow    9. EFW 61st% based on the initial ultrasound assessment from January 18, 2019  10. MCA Doppler SD ratio was >6 which is within normal limits for this gestational age  6. Cigarette smoker      PLAN:  Laboratory testing was ordered including a urinalysis with culture and sensitivity, and urine drug screen.     Continuous fetal heart rate and uterine contraction monitoring should be utilized for now. DVT prophylaxis should be utilized throughout her admission. The biophysical profile and cord Doppler studies will be repeated in the morning. The patient was advised of the importance of discontinuing her cigarette smoking.     Further evaluation and management will be dependent on the results of the patient's testing and her clinical presentation.      If you have any questions regarding her management, please contact me at your convenience and thank you for allowing me to participate in her care.     Sincerely,           Pawan Sheffield MD, [address], [facility], [address] [facility],  Tanisha Frost [facility]  Director [address] [place] [address]  [phone]

## 2019-04-29 NOTE — PROGRESS NOTES
-prev vag, 36w4d, ijeoma . No prenatal care. States she has called dr. Vanessa Barrett and gal to try to get appointments, but has not been able to. Presents for spotting that occurred once today at 1100 when she wiped after urinating. She states there was a small amount of red blood. Also states she has had cramping since then 6/10. No other complaints. Denies complications that she is aware of with this pregnancy and prior pregnancies.

## 2019-04-30 VITALS
TEMPERATURE: 97.7 F | HEART RATE: 82 BPM | RESPIRATION RATE: 12 BRPM | SYSTOLIC BLOOD PRESSURE: 113 MMHG | DIASTOLIC BLOOD PRESSURE: 62 MMHG

## 2019-04-30 LAB
AMPHETAMINE SCREEN, URINE: NOT DETECTED
BACTERIA: NORMAL /HPF
BARBITURATE SCREEN URINE: NOT DETECTED
BENZODIAZEPINE SCREEN, URINE: NOT DETECTED
BILIRUBIN URINE: NEGATIVE
BLOOD, URINE: NORMAL
CANNABINOID SCREEN URINE: NOT DETECTED
CLARITY: CLEAR
COCAINE METABOLITE SCREEN URINE: NOT DETECTED
COLOR: YELLOW
GLUCOSE URINE: NEGATIVE MG/DL
HEPATITIS B SURFACE ANTIGEN INTERPRETATION: NORMAL
HIV-1 AND HIV-2 ANTIBODIES: NORMAL
KETONES, URINE: NEGATIVE MG/DL
LEUKOCYTE ESTERASE, URINE: NEGATIVE
METHADONE SCREEN, URINE: NOT DETECTED
NITRITE, URINE: NEGATIVE
OPIATE SCREEN URINE: NOT DETECTED
PH UA: 6.5 (ref 5–9)
PHENCYCLIDINE SCREEN URINE: NOT DETECTED
PROPOXYPHENE SCREEN: NOT DETECTED
PROTEIN UA: NEGATIVE MG/DL
RBC UA: NORMAL /HPF (ref 0–2)
RPR: NORMAL
RUBELLA ANTIBODY IGG: NORMAL
SPECIFIC GRAVITY UA: 1.02 (ref 1–1.03)
UROBILINOGEN, URINE: 1 E.U./DL
WBC UA: NORMAL /HPF (ref 0–5)

## 2019-04-30 PROCEDURE — 76820 UMBILICAL ARTERY ECHO: CPT | Performed by: OBSTETRICS & GYNECOLOGY

## 2019-04-30 PROCEDURE — 76819 FETAL BIOPHYS PROFIL W/O NST: CPT | Performed by: OBSTETRICS & GYNECOLOGY

## 2019-04-30 PROCEDURE — 81001 URINALYSIS AUTO W/SCOPE: CPT

## 2019-04-30 PROCEDURE — 76819 FETAL BIOPHYS PROFIL W/O NST: CPT

## 2019-04-30 PROCEDURE — 80307 DRUG TEST PRSMV CHEM ANLYZR: CPT

## 2019-04-30 PROCEDURE — G0378 HOSPITAL OBSERVATION PER HR: HCPCS

## 2019-04-30 PROCEDURE — 99213 OFFICE O/P EST LOW 20 MIN: CPT | Performed by: OBSTETRICS & GYNECOLOGY

## 2019-04-30 PROCEDURE — 76820 UMBILICAL ARTERY ECHO: CPT

## 2019-04-30 PROCEDURE — 87088 URINE BACTERIA CULTURE: CPT

## 2019-04-30 NOTE — PROGRESS NOTES
SINCE LMP NO     UTI RECURRENT NO   HPV NO      OB History    Para Term  AB Living   6 5 5     5   SAB TAB Ectopic Molar Multiple Live Births           0 5       # Outcome Date GA Lbr Cortez/2nd Weight Sex Delivery Anes PTL Lv   6 Current                     5 Term 18 39w6d   7 lb 6.5 oz (3.36 kg) F Vag-Spont None N DAVID      Complications: Precipitous Labor   4 Term 17 38w5d   7 lb 15 oz (3.6 kg) M Vag-Spont EPI N DAVID   3 Term 03/26/15         Vag-Spont   N DAVID   2 Term 14 39w0d   7 lb 8 oz (3.402 kg) M Vag-Spont EPI N DAVID   1 Term 02/17/10 39w0d   7 lb 2 oz (3.232 kg) M Vag-Spont EPI N DAVID      PAST GYNECOLOGICAL  HISTORY:  Positive for abnormal pap smears. Doesn't know the grade. Negative for sexually transmitted diseases. Negative for cervical LEEP / conization /cryosurgery.    Negative for uterine surgery.    Negative for ovarian or tubal surgery.      Past Medical History:   Diagnosis Date    Anemia      Asthma       childhood    Mental disorder       depression and anxiety    Postpartum depression              Past Surgical History:   Procedure Laterality Date    CHOLECYSTECTOMY   2017     Dr. Gena Huddleston    ERCP   2017     Dr. Yasir Gonzalez            Allergies   Allergen Reactions    Motrin [Ibuprofen] Hives       Pt states allergy is to over the counter only    Tylenol [Acetaminophen] Hives       Pt states allergy is to over the counter tylenol only      Current Medication   No current facility-administered medications for this encounter.          Social History   Substance Use Topics    Smoking status: Current Every Day Smoker       Packs/day: 0.25       Types: Cigarettes    Smokeless tobacco: Never Used    Alcohol use No      FAMILY MEDICAL HISTORY:   Negative for congenital abnormalities, autism, genetic disease and mental retardation, not listed above.      Review of Systems :   CONSTITUTIONAL : No fever, no chills   HEENT : No headache, no visual changes, no rhinorrhea, no sore throat   CARDIOVASCULAR : No pain, no palpitations, no edema   RESPIRATORY : No pain, no shortness of breath   GASTROINTESTINAL : No N/V, no D/C, Suprapubic abdominal pain   GENITOURINARY : No dysuria, hematuria and no incontinence   MUSCULOSKELETAL : No myalgia, No back pain  NEUROLOGICAL : No numbness, no tingling, no tremors. No history of seizures  ALL OTHER SYSTEMS WERE REPORTED AS NEGATIVE.     PERTINENT PHYSICAL EXAMINATION:   Patient Vitals for the past 24 hrs:   BP Temp Temp src Pulse Resp   04/30/19 1237 113/62 97.7 °F (36.5 °C) Oral 82 12   04/30/19 0818 (!) 105/59 98.1 °F (36.7 °C) Oral 96 16   04/30/19 0102 107/74 -- -- 78 16   04/30/19 0001 (!) 103/48 -- -- 80 16   04/29/19 2201 (!) 102/59 -- -- 78 --   04/29/19 2100 114/66 -- -- 83 --   04/29/19 2001 115/60 -- -- 81 --     GENERAL:   The patient is a well developed, female who is alert cooperative and oriented times three in no acute distress.     HEENT:  Normo cephalic and atraumatic. No facial edema.      ABDOMEN:   Her uterus is gravid. She had no complaint of abdominal pain or tenderness. The fetal heart rate is 145 bpm. The fetus was in the cephalic presentation which was confirmed by the ultrasound assessment.     EXTREMITIES:  No peripheral edema is noted.      PELVIC EXAMINATION:  Not repeated. The original evaluation performed by Catherine Greene on admission,       A fetal ultrasound assessment was performed today. A report is enclosed for your review.     Admission on 04/29/2019   Component Date Value Ref Range Status    WBC 04/29/2019 11.6* 4.5 - 11.5 E9/L Final    RBC 04/29/2019 4.61  3.50 - 5.50 E12/L Final    Hemoglobin 04/29/2019 9.2* 11.5 - 15.5 g/dL Final    Hematocrit 04/29/2019 29.7* 34.0 - 48.0 % Final    MCV 04/29/2019 64.4* 80.0 - 99.9 fL Final    MCH 04/29/2019 20.0* 26.0 - 35.0 pg Final    MCHC 04/29/2019 31.0* 32.0 - 34.5 % Final    RDW 04/29/2019 17.3* 11.5 - 15.0 fL Final    Platelets 31/32/3740 262 130 - 450 E9/L Final    MPV 04/29/2019 10.2  7.0 - 12.0 fL Final    ABO/Rh 04/29/2019 A POS   Final    Antibody Screen 04/29/2019 NEG   Final    Hep B S Ag Interp 04/29/2019 Non-Reactive  NON REACT Final    Rubella Antibody IgG 04/29/2019 SEE BELOW  IMMUNE Final    HIV-1/HIV-2 Ab 04/29/2019 Non-Reactive  NON REACT Final    RPR 04/29/2019 NON-REACTIVE  Non-reactive Final    Color, UA 04/30/2019 Yellow  Straw/Yellow Final    Clarity, UA 04/30/2019 Clear  Clear Final    Glucose, Ur 04/30/2019 Negative  Negative mg/dL Final    Bilirubin Urine 04/30/2019 Negative  Negative Final    Ketones, Urine 04/30/2019 Negative  Negative mg/dL Final    Specific Gravity, UA 04/30/2019 1.020  1.005 - 1.030 Final    Blood, Urine 04/30/2019 TRACE-INTACT  Negative Final    pH, UA 04/30/2019 6.5  5.0 - 9.0 Final    Protein, UA 04/30/2019 Negative  Negative mg/dL Final    Urobilinogen, Urine 04/30/2019 1.0  <2.0 E.U./dL Final    Nitrite, Urine 04/30/2019 Negative  Negative Final    Leukocyte Esterase, Urine 04/30/2019 Negative  Negative Final    Amphetamine Screen, Urine 04/30/2019 NOT DETECTED  Negative <1000 ng/mL Final    Barbiturate Screen, Ur 04/30/2019 NOT DETECTED  Negative < 200 ng/mL Final    Benzodiazepine Screen, Urine 04/30/2019 NOT DETECTED  Negative < 200 ng/mL Final    Cannabinoid Scrn, Ur 04/30/2019 NOT DETECTED  Negative < 50ng/mL Final    Cocaine Metabolite Screen, Urine 04/30/2019 NOT DETECTED  Negative < 300 ng/mL Final    Opiate Scrn, Ur 04/30/2019 NOT DETECTED  Negative < 300ng/mL Final    PCP Screen, Urine 04/30/2019 NOT DETECTED  Negative < 25 ng/mL Final    Methadone Screen, Urine 04/30/2019 NOT DETECTED  Negative <300 ng/mL Final    Propoxyphene Scrn, Ur 04/30/2019 NOT DETECTED  Negative <300 ng/mL Final    WBC, UA 04/30/2019 NONE  0 - 5 /HPF Final    RBC, UA 04/30/2019 0-1  0 - 2 /HPF Final    Bacteria, UA 04/30/2019 NONE  /HPF Final       IMPRESSION:    1.  IUP at 36 weeks 0 days gestation based on her Estimated Date of Delivery: 19    2.  Limited prenatal care    3.  Grand multiparity    4.  Previous ultrasound 2019. LMP dates were uncertain.     6.  Reactive NST with occasional variables.    7.  No apparent gross fetal anatomic abnormalities, with limited visualization as noted above.    8.  Elevated cord Doppler SD ratios, without absence or reversal of end-diastolic flow    9. EFW 61st% based on the initial ultrasound assessment from 2019  10. MCA Doppler SD ratio was >6 on 2019, which is within normal limits for this gestational age  6. Cigarette smoker   12. Hypochromic microcytic anemia     PLAN:  I would recommend that the patient count fetal movements and call if she notices any subjective decrease in fetal movements, particularly if there are less than 10 major movements in an hour. Non-stress testing should be performed every 3 to 4 days through the balance of the pregnancy. Serial ultrasounds to assess fetal anatomy and growth should be performed. The patient is at increase risk for  morbidity and mortality secondary to her history. Weekly BPP and cord Doppler testing should be performed, unless there is a clinical indication to perform the testing more frequently. The patient was advised to call if she has any increased vaginal discharge, vaginal bleeding, contractions, abdominal pain, back pain or any new significant symptomatology prior to her next visit. I advised her that these are signs and symptoms of cervical change and require follow-up assessment when they occur.     The patient was advised of the importance of discontinuing her smoking and not being in the presence of others who smoke.     Further evaluation and management will be dependent on the results of the patient's testing and her clinical presentation.      If you have any questions regarding her management, please contact me at your convenience and thank you for allowing me to participate in her care.     Sincerely,           George Bustillo MD, [address], Vanessa Mueller, [address] [facility], [address] Tanish[address]  Director [address] [place] [address]  [phone]

## 2019-04-30 NOTE — PROGRESS NOTES
Pt given discharge instructions and verbalized understanding. She exited off unit ambulatory with significant other and children.

## 2019-04-30 NOTE — PROGRESS NOTES
Dr. Zonia Tafoya at bedside to see pt. Ordered pt can go home from his standpoint and she is to return to l&d on Friday for a NST and follow up with the [facility] within the next week.

## 2019-04-30 NOTE — PROGRESS NOTES
Patient back from [facility], to bathroom to wash up at the sink. Patient asking if she can eat, will ask house officer.

## 2019-04-30 NOTE — PROGRESS NOTES
Pt sitting up in bed eating lunch. D/t EFM not tracing well while sitting up, pt was taken off efm. Will place back on once she is finished.

## 2019-05-02 LAB
CHLAMYDIA TRACHOMATIS AMPLIFIED DET: NORMAL
GROUP B STREP CULTURE: NORMAL
N GONORRHOEAE AMPLIFIED DET: NORMAL
URINE CULTURE, ROUTINE: NORMAL

## 2019-05-03 PROBLEM — Z3A.37 37 WEEKS GESTATION OF PREGNANCY: Status: ACTIVE | Noted: 2019-05-03

## 2019-05-06 ENCOUNTER — HOSPITAL ENCOUNTER (OUTPATIENT)
Age: 24
Discharge: HOME | End: 2019-05-06
Attending: OBSTETRICS & GYNECOLOGY | Admitting: OBSTETRICS & GYNECOLOGY
Payer: MEDICAID

## 2019-05-06 ENCOUNTER — APPOINTMENT (OUTPATIENT)
Dept: LABOR AND DELIVERY | Age: 24
End: 2019-05-06
Payer: MEDICAID

## 2019-05-06 VITALS — DIASTOLIC BLOOD PRESSURE: 60 MMHG | HEART RATE: 85 BPM | SYSTOLIC BLOOD PRESSURE: 110 MMHG

## 2019-05-06 PROBLEM — O09.30 LATE PRENATAL CARE: Status: ACTIVE | Noted: 2019-05-06

## 2019-05-06 PROCEDURE — 59025 FETAL NON-STRESS TEST: CPT

## 2019-05-06 PROCEDURE — 59025 FETAL NON-STRESS TEST: CPT | Performed by: MIDWIFE

## 2019-05-06 NOTE — PROGRESS NOTES
Pt here for nst for late pnc and elevated dopplers. Denies vb, lof, decreased fm, ctx. No complaints.

## 2019-05-06 NOTE — PROGRESS NOTES
Pt given verbal and typed dischharge instructions, verbalizes understanding. Ambulatory upon discharge.  Next NST scheduled for Thursday 5/9 at 1500

## 2019-05-08 ENCOUNTER — HOSPITAL ENCOUNTER (OUTPATIENT)
Age: 24
Setting detail: OBSERVATION
Discharge: HOME | End: 2019-05-08
Attending: OBSTETRICS & GYNECOLOGY | Admitting: OBSTETRICS & GYNECOLOGY
Payer: MEDICAID

## 2019-05-08 VITALS
DIASTOLIC BLOOD PRESSURE: 63 MMHG | HEIGHT: 62 IN | HEART RATE: 88 BPM | SYSTOLIC BLOOD PRESSURE: 118 MMHG | BODY MASS INDEX: 29.44 KG/M2 | RESPIRATION RATE: 16 BRPM | TEMPERATURE: 98.1 F | WEIGHT: 160 LBS

## 2019-05-08 PROBLEM — Z64.1 MULTIPARITY: Status: ACTIVE | Noted: 2019-05-08

## 2019-05-08 LAB — AMNISURE ROM (POC): NEGATIVE

## 2019-05-08 PROCEDURE — 76820 UMBILICAL ARTERY ECHO: CPT

## 2019-05-08 PROCEDURE — 76816 OB US FOLLOW-UP PER FETUS: CPT

## 2019-05-08 PROCEDURE — 99243 OFF/OP CNSLTJ NEW/EST LOW 30: CPT | Performed by: OBSTETRICS & GYNECOLOGY

## 2019-05-08 PROCEDURE — 76820 UMBILICAL ARTERY ECHO: CPT | Performed by: OBSTETRICS & GYNECOLOGY

## 2019-05-08 PROCEDURE — 76819 FETAL BIOPHYS PROFIL W/O NST: CPT | Performed by: OBSTETRICS & GYNECOLOGY

## 2019-05-08 PROCEDURE — 84112 EVAL AMNIOTIC FLUID PROTEIN: CPT

## 2019-05-08 PROCEDURE — G0378 HOSPITAL OBSERVATION PER HR: HCPCS

## 2019-05-08 PROCEDURE — 76819 FETAL BIOPHYS PROFIL W/O NST: CPT

## 2019-05-08 PROCEDURE — 76816 OB US FOLLOW-UP PER FETUS: CPT | Performed by: OBSTETRICS & GYNECOLOGY

## 2019-05-08 NOTE — H&P
Department of Obstetrics and Gynecology  Attending Obstetrics History and Physical      HISTORY OF PRESENT ILLNESS:      The patient is a 21 y.o.  6 parity 5 at 37 weeks 1 days. Complaining of leaking of fluid. amnisure neg. Elevated cord dopplers on 19. Limited prenatal care. Estimated Due Date:  19  Contractions:  no  nfm  Blleeding:  No    PRENATAL CARE:    Complications: limited care      Active Problems:    * No active hospital problems. *  Resolved Problems:    * No resolved hospital problems. *        PAST OB HISTORY    OB History    Para Term  AB Living   6 5 5     5   SAB TAB Ectopic Molar Multiple Live Births           0 5      # Outcome Date GA Lbr Cortez/2nd Weight Sex Delivery Anes PTL Lv   6 Current            5 Term 18 39w6d  7 lb 6.5 oz (3.36 kg) F Vag-Spont None N DAVID      Complications: Precipitous Labor   4 Term 17 38w5d  7 lb 15 oz (3.6 kg) M Vag-Spont EPI N DAVID   3 Term 03/26/15     Vag-Spont  N DAVID   2 Term 14 39w0d  7 lb 8 oz (3.402 kg) M Vag-Spont EPI N DAVID   1 Term 02/17/10 39w0d  7 lb 2 oz (3.232 kg) M Vag-Spont EPI N DAVID           Pre-eclampsia:  No      :  No      D & C:  No      Cerclage:  No      LEEP:  No      Myomectomy:  No       Labor: No    Past Medical History:    Past Medical History:   Diagnosis Date    Anemia     Asthma     childhood    Mental disorder     depression and anxiety    Postpartum depression         Past Surgical History:    Past Surgical History:   Procedure Laterality Date    CHOLECYSTECTOMY  2017    Dr. Mary Sol    ERCP  2017    Dr. Norlene Seip        Past Family History:  Family History   Problem Relation Age of Onset    Kidney Disease Father        ROS:  Const: No fever, chills, night sweats, no recent unexplained weight gain/loss  HEENT: No blurred vision, double vision; no ear problems; no sore throat, congestion; no running nose.   Resp: No cough, no sputum, no pleuritic chest pain, AM

## 2019-05-08 NOTE — PROGRESS NOTES
Discharge instructions given, patient provided signature as written understanding. Patient has an appt on 5/15 at the clinic, and phone number given for MFM and patient instructed to call for NST and appt on Monday or Tuesday, and patient verbalized understanding.

## 2019-05-08 NOTE — PROGRESS NOTES
Spoke with Dr. Kirti Marie regarding Dr. Funmi Casillas recommendation that patient can go home, discharge order received.

## 2019-05-08 NOTE — CONSULTS
RE:  RUBY Shi  PNN:    AGE: 23 y. o.     This report has been created using voice recognition software. It may contain errors which are inherent in voice recognition technology.     Dear Doctor:     I saw your Klarissa Session for the following indications:     Patient Active Problem List   Diagnosis    Abdominal pain affecting pregnancy    No prenatal care in current pregnancy in second trimester    Suprapubic abdominal pain    Asymptomatic microscopic hematuria    Grand multipara      As you know, your patient is a 23 y.o. female, who is G6(5,0,0,5). She has an Estimated Date of Delivery: 19 based on her previous ultrasound assessment, on 2019. She stated that her last menstrual dating parameters were uncertain.  Jenny Babb is currently 37 weeks 1 days gestation based on the previous assessment.      The patient is had limited prenatal care. She was admitted to the labor and delivery unit for evaluation and management secondary to complaint of  leaking amniotic fluid. She was evaluated by Dr. Mao Santiago on admission. There was no evidence of rupture of membranes. Her Amnisure assessment was negative. She stated she has had no leaking fluid since admission.     The fetal heart rate tracing is reassuring with moderate variability and accelerations.       The patient has hypochromic, microcytic anemia with a low ferritin level, consistent with iron deficiency anemia.     The patient is a cigarette smoker. I advised the patient of the importance of discontinuing her smoking activity. She was advised of the increased risk of  morbidity and mortality associated with this activity. This includes, but is not limited to; the increased for  labor and delivery,  rupture of membranes, placental abruption, intrauterine growth restriction and intrauterine death.  I also advised her of the significant increased risk of sudden infant death syndrome in individuals that smoke.      The patient is had 5 previous vaginal deliveries without complications.  She delivered her most recent pregnancy at home The patient has had 5 previous deliveries. She is at increased risk of  morbidity and mortality secondary to grand-multiparity. She is at a significant risk of post partum hemorrhage from uterine atony.      A fetal ultrasound assessment was performed today. The estimated fetal weight is at the 52nd%. The SANTO is 11 cm. The BPP and cord Doppler studies were both reassuring. There was no evidence of absence or reversal of end-diastolic flow.   The fetus was in the cephalic presentation.                                GENETIC SCREENING/TERATOLOGY COUNSELING                            (Includes patient, FTB, and any affected family members)     Patient Age > 34 Years NO   Thalassemia ( MVC<80) NO   Congential Heart Defect NO   Neural Tube Defect NO   Brandon-Sachs NO   Sickle Cell Disease NO   Sickle Cell Trait NO   Sickle C Disease or Trait NO   Hemophilia NO   Muscular Dystrophy NO   Cystic Fibrosis NO   Terri Disease NO   Autism NO   Mental Retardation NO   History of Fragile X NO   Maternal Diabetes NO   Other Genetic Disease or Syndrome NO   Previous Child With Congenital Abnormality Not Listed NO   Recreational Drugs NO                                                INFECTION HISTORY          HEPATITIS IMMUNIZED:  YES     HEPATITIS INFECTION:  NO     EXPOSURE TO TB NO     GENITAL HERPES    NO     PARVOVIRUS B-19 NO     CHICKEN POX  NO     MEASLES NO     STD NO     HIV NO     OTHER RASH OR VIRAL ILLNESS SINCE LMP NO     UTI RECURRENT NO   HPV NO      OB History    Para Term  AB Living   6 5 5     5   SAB TAB Ectopic Molar Multiple Live Births           0 5       # Outcome Date GA Lbr Cortez/2nd Weight Sex Delivery Anes PTL Lv   6 Current                     5 Term 18 39w6d   7 lb 6.5 oz (3.36 kg) F Vag-Spont None N DAVID      Complications: Precipitous Labor   4 Term 03/11/17 38w5d   7 lb 15 oz (3.6 kg) M Vag-Spont EPI N DAVID   3 Term 03/26/15         Vag-Spont   N DAVID   2 Term 08/25/14 39w0d   7 lb 8 oz (3.402 kg) M Vag-Spont EPI N DAVID   1 Term 02/17/10 39w0d   7 lb 2 oz (3.232 kg) M Vag-Spont EPI N DAVID      PAST GYNECOLOGICAL  HISTORY:  Positive for abnormal pap smears. Doesn't know the grade. Negative for sexually transmitted diseases. Negative for cervical LEEP / conization /cryosurgery.    Negative for uterine surgery. Negative for ovarian or tubal surgery.      Past Medical History:   Diagnosis Date    Anemia      Asthma       childhood    Mental disorder       depression and anxiety    Postpartum depression              Past Surgical History:   Procedure Laterality Date    CHOLECYSTECTOMY   06/01/2017     Dr. Jessica Soares    ERCP   06/02/2017     Dr. Patrica Jaime            Allergies   Allergen Reactions    Motrin [Ibuprofen] Hives       Pt states allergy is to over the counter only    Tylenol [Acetaminophen] Hives       Pt states allergy is to over the counter tylenol only      Current Medication   No current facility-administered medications for this encounter.          Social History   Substance Use Topics    Smoking status: Current Every Day Smoker       Packs/day: 0.25       Types: Cigarettes    Smokeless tobacco: Never Used    Alcohol use No      FAMILY MEDICAL HISTORY:   Negative for congenital abnormalities, autism, genetic disease and mental retardation, not listed above.      Review of Systems :   CONSTITUTIONAL : No fever, no chills   HEENT : No headache, no visual changes, no rhinorrhea, no sore throat   CARDIOVASCULAR : No pain, no palpitations, no edema   RESPIRATORY : No pain, no shortness of breath   GASTROINTESTINAL : No N/V, no D/C, Suprapubic abdominal pain   GENITOURINARY : No dysuria, hematuria and no incontinence   MUSCULOSKELETAL : No myalgia, No back pain  NEUROLOGICAL : No numbness, no tingling, no tremors.  No history of seizures  ALL OTHER SYSTEMS WERE REPORTED AS NEGATIVE.     PERTINENT PHYSICAL EXAMINATION:   Patient Vitals for the past 24 hrs:   BP Temp Temp src Pulse Resp Height Weight   05/08/19 1515 118/63 98.1 °F (36.7 °C) Oral 88 16 -- --   05/08/19 0758 (!) 106/56 98.3 °F (36.8 °C) Oral 82 18 -- --   05/08/19 0524 125/76 98.5 °F (36.9 °C) Oral 102 18 5' 2\" (1.575 m) 160 lb (72.6 kg)      GENERAL:   The patient is a well developed, female who is alert cooperative and oriented times three in no acute distress.     HEENT:  Normo cephalic and atraumatic. No facial edema.      ABDOMEN:   Her uterus is gravid. She had no complaint of abdominal pain or tenderness. The fetal heart rate is 125 bpm. The fetus is in the cephalic presentation which was confirmed by the ultrasound assessment.     EXTREMITIES:  No peripheral edema is noted.      PELVIC EXAMINATION:  Not repeated. Performed by Dr. Daysi Lewis on admission. The cervix was reported as closed, long, -3 station, medium consistency and posterior position.        A fetal ultrasound assessment was performed today. A report is enclosed for your review.      IMPRESSION:    1.  IUP at [facility] 1 days gestation based on her Estimated Date of Delivery: 5/28/19    2.  Limited prenatal care    3.  Grand multiparity    4.  Previous ultrasound 1/18/2019. LMP dates were uncertain.     6.  Reactive NST   Marissa.Murders.  No apparent gross fetal anatomic abnormalities    8.  Reassuring biophysical profile and cord Doppler testing    9. EFW 52nd%   10. Cigarette smoker   11. Membranes not confirmed as ruptured.      PLAN:  I would recommend that the patient count fetal movements and call if she notices any subjective decrease in fetal movements, particularly if there are less than 10 major movements in an hour. Non-stress testing should be performed every 3 to 4 days through the balance of the pregnancy. Serial ultrasounds to assess fetal anatomy and growth should be performed.  The patient is at increase risk for  morbidity and mortality secondary to her history. Weekly BPP and cord Doppler testing should be performed, unless there is a clinical indication to perform the testing more frequently. The patient was advised to call if she has any increased vaginal discharge, vaginal bleeding, contractions, abdominal pain, back pain or any new significant symptomatology prior to her next visit. I advised her that these are signs and symptoms of cervical change and require follow-up assessment when they occur.     The patient is to continue to follow in the prenatal clinic for ongoing evaluation and management.     The patient was advised of the importance of discontinuing her cigarette smoking.     Further evaluation and management will be dependent on the results of the patient's testing and her clinical presentation.      If you have any questions regarding her management, please contact me at your convenience and thank you for allowing me to participate in her care.     Sincerely,           Heladio Orourke MD, [address], [address], [address] [facility], [address] [place] [address] Michael, [facility]  Director [address] [place] [address]  [phone]

## 2019-05-08 NOTE — PROGRESS NOTES
Assumed pt care at 07:30. Denies feeling ctx's, no VB/LOF. States +FM. Awaiting MFM to see pt. Assessment completed, call light within reach.

## 2019-05-17 ENCOUNTER — INITIAL PRENATAL (OUTPATIENT)
Dept: OBGYN | Age: 24
End: 2019-05-17
Payer: MEDICAID

## 2019-05-17 VITALS
BODY MASS INDEX: 32.19 KG/M2 | HEART RATE: 113 BPM | DIASTOLIC BLOOD PRESSURE: 60 MMHG | WEIGHT: 176 LBS | SYSTOLIC BLOOD PRESSURE: 107 MMHG

## 2019-05-17 DIAGNOSIS — Z91.199 MEDICAL NON-COMPLIANCE: ICD-10-CM

## 2019-05-17 DIAGNOSIS — Z34.93 PRENATAL CARE IN THIRD TRIMESTER: Primary | ICD-10-CM

## 2019-05-17 DIAGNOSIS — O09.33 LIMITED PRENATAL CARE IN THIRD TRIMESTER: ICD-10-CM

## 2019-05-17 DIAGNOSIS — Z64.1 GRAND MULTIPARITY: ICD-10-CM

## 2019-05-17 LAB
GLUCOSE URINE, POC: NEGATIVE
PROTEIN UA: ABNORMAL

## 2019-05-17 PROCEDURE — G8417 CALC BMI ABV UP PARAM F/U: HCPCS | Performed by: OBSTETRICS & GYNECOLOGY

## 2019-05-17 PROCEDURE — 99202 OFFICE O/P NEW SF 15 MIN: CPT | Performed by: OBSTETRICS & GYNECOLOGY

## 2019-05-17 PROCEDURE — G8427 DOCREV CUR MEDS BY ELIG CLIN: HCPCS | Performed by: OBSTETRICS & GYNECOLOGY

## 2019-05-17 PROCEDURE — 81002 URINALYSIS NONAUTO W/O SCOPE: CPT | Performed by: OBSTETRICS & GYNECOLOGY

## 2019-05-17 PROCEDURE — 59025 FETAL NON-STRESS TEST: CPT | Performed by: OBSTETRICS & GYNECOLOGY

## 2019-05-17 PROCEDURE — 4004F PT TOBACCO SCREEN RCVD TLK: CPT | Performed by: OBSTETRICS & GYNECOLOGY

## 2019-05-17 PROCEDURE — 99203 OFFICE O/P NEW LOW 30 MIN: CPT | Performed by: OBSTETRICS & GYNECOLOGY

## 2019-05-17 NOTE — PROGRESS NOTES
URBANO-S called [facility] to schedule patient for counseling. Secured appointment for patient on 5/24 at 2:00PM. SW called client and notified of appointment date and time and provided agency contact information if needed. Will f/u with her at next appointment in [facility].

## 2019-05-17 NOTE — PROGRESS NOTES
MOHIT present to complete prenatal registration assessment. Patient presents to our clinic for the first time and reports minimal prenatal care. She is alone at appointment. Reports she was a previous patient of  and attempted to resume care with her but was unable to do so but also stated she was previously with  and . When questioned why she did not obtain prenatal care reports because she never heard back from [facility] so decided to just not follow through. Social Hx/Supports:  Patient reports she has five other children ages 7,2,1,4,3 and that she lives with all of her children as well as FOB that she identifies as Erin Puente who is the father of all her children except the oldest. She reports Darrel Joy is currently unemployed but was previously working at [employer]. Patient reports she is on maternity leave but was previously working at [employer].   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 She reports she is supposed to be trying to get him into [facility] for counseling but has not done so as of yet. She reports prior cases were a few years ago when her son walked to school and the school called but she reports not knowing that he did this and the case was closed after a few weeks. She also reports positive UDS for marijuana with previous births causing CPS involvement. Psychiatric Hx/Involvement/Sx:   Patient reports a hx of depression and anxiety and previously being tx at [facility] a few months ago but has not returned. She also reports she was prescribed zoloft by Dr. Ghazala Wolfe but that she did not feel this was helpful. She did endorse positive benefits from counseling but unsure why she decided not to return. Did agree to reinitiate counseling services. Denies any current SI/HI plan/intent.  One previous suicide attempt when a teenager where she reports taking a bunch of different medications including tylenol. Patient reports positive for mental health throughout maternal s/o the family but has no information regarding fathers side. Reports marijuana use. Initially denied use for awhile but then disclosed last use was last month. Denied any other AoD use/abuse. Resources:  Patient reports that she receives $718/month in food stamps and $790/month in cash assistance. Reports that all of her children receive pediatric care of the [address] [place] [address]. She reports she was linked with [facility] but has not returned contact in awhile reporting she needs to follow up with them regarding a car seat and bed. Also reports she was supposed to linked with [facility] but never responded to this either. Notified of alternative pregnancy resources within the community to utilize. Impression/Plan:   There are concerns regarding lack of prenatal care and reports of depression, minimal follow through with supportive services. Discussed this with patient. Agreed to reinitiate counseling services with [facility]. LISW-S will schedule and notify patient of appointment information. Discussed early warning signs and completed verbal safety planning. Provided SW contact information regarding further questions/concenrs. Will continue to follow.

## 2019-05-17 NOTE — PROGRESS NOTES
Temp 98.3  Here today for initial prenatal visit at 39.5 weeks  No prenatal care. Pt was at [facility] and had GBS and GC/CT cultures done on 19  Denies lof, vb. States ora hopkins  States +fm  , all   Urine dipstick done  Assisted Dr. Javier Fowler with pelvic exam, ft dilated. Patient placed on NST 4921-5047 reactive per him  130, moderate variability, accels, no decels. Patient informed she will be delivered at [facility] by house officer. Pt verbalized understanding  URBANO Martins saw patient. See her note. 2 proof of pregnancy given to patient. Patient scheduled for nst Tuesday at 10 am at [facility]. Scheduled with Otto Galindo. Pt informed. Discharge instructions given to patient by Dr. Javier Fowler. Pt verbalized understanding.

## 2019-05-21 ENCOUNTER — HOSPITAL ENCOUNTER (INPATIENT)
Age: 24
LOS: 2 days | Discharge: HOME | DRG: 560 | End: 2019-05-23
Attending: OBSTETRICS & GYNECOLOGY | Admitting: OBSTETRICS & GYNECOLOGY
Payer: MEDICAID

## 2019-05-21 ENCOUNTER — ANESTHESIA EVENT (OUTPATIENT)
Dept: LABOR AND DELIVERY | Age: 24
DRG: 560 | End: 2019-05-21
Payer: MEDICAID

## 2019-05-21 ENCOUNTER — ANESTHESIA (OUTPATIENT)
Dept: LABOR AND DELIVERY | Age: 24
DRG: 560 | End: 2019-05-21
Payer: MEDICAID

## 2019-05-21 PROBLEM — Z3A.37 37 WEEKS GESTATION OF PREGNANCY: Status: RESOLVED | Noted: 2019-05-03 | Resolved: 2019-05-21

## 2019-05-21 PROBLEM — O99.019 IRON DEFICIENCY ANEMIA OF PREGNANCY: Status: ACTIVE | Noted: 2019-05-21

## 2019-05-21 PROBLEM — D50.9 IRON DEFICIENCY ANEMIA OF PREGNANCY: Status: ACTIVE | Noted: 2019-05-21

## 2019-05-21 PROBLEM — Z3A.39 39 WEEKS GESTATION OF PREGNANCY: Status: ACTIVE | Noted: 2019-05-21

## 2019-05-21 PROBLEM — Z64.1 MULTIPARITY: Status: RESOLVED | Noted: 2019-05-08 | Resolved: 2019-05-21

## 2019-05-21 LAB
ABO/RH: NORMAL
AMPHETAMINE SCREEN, URINE: NOT DETECTED
ANTIBODY SCREEN: NORMAL
BARBITURATE SCREEN URINE: NOT DETECTED
BENZODIAZEPINE SCREEN, URINE: NOT DETECTED
CANNABINOID SCREEN URINE: NOT DETECTED
COCAINE METABOLITE SCREEN URINE: NOT DETECTED
HCT VFR BLD CALC: 34.8 % (ref 34–48)
HEMOGLOBIN: 10.6 G/DL (ref 11.5–15.5)
KLEIHAUER BETKE: NORMAL
MCH RBC QN AUTO: 19.4 PG (ref 26–35)
MCHC RBC AUTO-ENTMCNC: 30.5 % (ref 32–34.5)
MCV RBC AUTO: 63.7 FL (ref 80–99.9)
METHADONE SCREEN, URINE: NOT DETECTED
OPIATE SCREEN URINE: NOT DETECTED
PDW BLD-RTO: 19.6 FL (ref 11.5–15)
PHENCYCLIDINE SCREEN URINE: NOT DETECTED
PLATELET # BLD: 276 E9/L (ref 130–450)
PMV BLD AUTO: ABNORMAL FL (ref 7–12)
PROPOXYPHENE SCREEN: NOT DETECTED
RBC # BLD: 5.46 E12/L (ref 3.5–5.5)
WBC # BLD: 12.2 E9/L (ref 4.5–11.5)

## 2019-05-21 PROCEDURE — 3700000025 EPIDURAL BLOCK: Performed by: ANESTHESIOLOGY

## 2019-05-21 PROCEDURE — 6360000002 HC RX W HCPCS

## 2019-05-21 PROCEDURE — 86900 BLOOD TYPING SEROLOGIC ABO: CPT

## 2019-05-21 PROCEDURE — 51701 INSERT BLADDER CATHETER: CPT

## 2019-05-21 PROCEDURE — 85460 HEMOGLOBIN FETAL: CPT

## 2019-05-21 PROCEDURE — 36415 COLL VENOUS BLD VENIPUNCTURE: CPT

## 2019-05-21 PROCEDURE — G0379 DIRECT REFER HOSPITAL OBSERV: HCPCS

## 2019-05-21 PROCEDURE — 2580000003 HC RX 258: Performed by: NURSE PRACTITIONER

## 2019-05-21 PROCEDURE — 86901 BLOOD TYPING SEROLOGIC RH(D): CPT

## 2019-05-21 PROCEDURE — G0378 HOSPITAL OBSERVATION PER HR: HCPCS

## 2019-05-21 PROCEDURE — 86850 RBC ANTIBODY SCREEN: CPT

## 2019-05-21 PROCEDURE — 6360000002 HC RX W HCPCS: Performed by: OBSTETRICS & GYNECOLOGY

## 2019-05-21 PROCEDURE — 80307 DRUG TEST PRSMV CHEM ANLYZR: CPT

## 2019-05-21 PROCEDURE — 6360000002 HC RX W HCPCS: Performed by: ANESTHESIOLOGY

## 2019-05-21 PROCEDURE — 1220000001 HC SEMI PRIVATE L&D R&B

## 2019-05-21 PROCEDURE — 85027 COMPLETE CBC AUTOMATED: CPT

## 2019-05-21 PROCEDURE — 2500000003 HC RX 250 WO HCPCS: Performed by: ANESTHESIOLOGY

## 2019-05-21 RX ORDER — LIDOCAINE HYDROCHLORIDE 10 MG/ML
INJECTION, SOLUTION INFILTRATION; PERINEURAL
Status: DISCONTINUED
Start: 2019-05-21 | End: 2019-05-22 | Stop reason: WASHOUT

## 2019-05-21 RX ORDER — ONDANSETRON 2 MG/ML
4 INJECTION INTRAMUSCULAR; INTRAVENOUS EVERY 6 HOURS PRN
Status: DISCONTINUED | OUTPATIENT
Start: 2019-05-21 | End: 2019-05-22

## 2019-05-21 RX ORDER — EPHEDRINE SULFATE/0.9% NACL/PF 50 MG/5 ML
SYRINGE (ML) INTRAVENOUS
Status: DISCONTINUED
Start: 2019-05-21 | End: 2019-05-22

## 2019-05-21 RX ORDER — NALOXONE HYDROCHLORIDE 0.4 MG/ML
0.4 INJECTION, SOLUTION INTRAMUSCULAR; INTRAVENOUS; SUBCUTANEOUS PRN
Status: DISCONTINUED | OUTPATIENT
Start: 2019-05-21 | End: 2019-05-22

## 2019-05-21 RX ORDER — DIPHENHYDRAMINE HYDROCHLORIDE 50 MG/ML
25 INJECTION INTRAMUSCULAR; INTRAVENOUS EVERY 6 HOURS PRN
Status: DISCONTINUED | OUTPATIENT
Start: 2019-05-21 | End: 2019-05-22

## 2019-05-21 RX ORDER — ACETAMINOPHEN 650 MG
TABLET, EXTENDED RELEASE ORAL
Status: COMPLETED
Start: 2019-05-21 | End: 2019-05-22

## 2019-05-21 RX ORDER — EPHEDRINE SULFATE 50 MG/ML
10 INJECTION, SOLUTION INTRAVENOUS PRN
Status: DISCONTINUED | OUTPATIENT
Start: 2019-05-21 | End: 2019-05-22

## 2019-05-21 RX ORDER — NALBUPHINE HCL 10 MG/ML
5 AMPUL (ML) INJECTION EVERY 4 HOURS PRN
Status: DISCONTINUED | OUTPATIENT
Start: 2019-05-21 | End: 2019-05-22

## 2019-05-21 RX ORDER — SODIUM CHLORIDE, SODIUM LACTATE, POTASSIUM CHLORIDE, CALCIUM CHLORIDE 600; 310; 30; 20 MG/100ML; MG/100ML; MG/100ML; MG/100ML
INJECTION, SOLUTION INTRAVENOUS CONTINUOUS
Status: DISCONTINUED | OUTPATIENT
Start: 2019-05-21 | End: 2019-05-22

## 2019-05-21 RX ADMIN — Medication 1 MILLI-UNITS/MIN: at 17:50

## 2019-05-21 RX ADMIN — SODIUM CHLORIDE, POTASSIUM CHLORIDE, SODIUM LACTATE AND CALCIUM CHLORIDE: 600; 310; 30; 20 INJECTION, SOLUTION INTRAVENOUS at 20:55

## 2019-05-21 RX ADMIN — Medication 15 ML/HR: at 17:30

## 2019-05-21 RX ADMIN — Medication 10 ML: at 21:00

## 2019-05-21 RX ADMIN — EPHEDRINE SULFATE 10 MG: 50 INJECTION INTRAMUSCULAR; INTRAVENOUS; SUBCUTANEOUS at 21:11

## 2019-05-21 RX ADMIN — DIPHENHYDRAMINE HYDROCHLORIDE 25 MG: 50 INJECTION, SOLUTION INTRAMUSCULAR; INTRAVENOUS at 19:56

## 2019-05-21 RX ADMIN — NALBUPHINE HYDROCHLORIDE 5 MG: 10 INJECTION, SOLUTION INTRAMUSCULAR; INTRAVENOUS; SUBCUTANEOUS at 18:25

## 2019-05-21 RX ADMIN — SODIUM CHLORIDE, POTASSIUM CHLORIDE, SODIUM LACTATE AND CALCIUM CHLORIDE: 600; 310; 30; 20 INJECTION, SOLUTION INTRAVENOUS at 17:00

## 2019-05-21 RX ADMIN — Medication 10 ML: at 17:30

## 2019-05-21 ASSESSMENT — LIFESTYLE VARIABLES: SMOKING_STATUS: 1

## 2019-05-21 ASSESSMENT — PAIN SCALES - GENERAL: PAINLEVEL_OUTOF10: 0

## 2019-05-21 NOTE — PROGRESS NOTES
Contractions are every  2-6 min. FH: 120/min with accelerations and moderate variability. Cervix: 4 cm/75-80%/Vx/-2    Plan: Epidural  Pit augmentation.

## 2019-05-21 NOTE — PROGRESS NOTES
L&D PROGRESS NOTE:    Patient:  Yazan Vázquez Date:  2019  9:49 AM  Medical Record Number:  [id number]   Today's Date: 2019    S: Dr Emily Ocampo MD requesting AROM. O:   Vitals:    19 1907 19 1910 19 1923   BP:    132/65   Pulse:    65   Resp:    16   Temp:    98.7 °F (37.1 °C)   TempSrc:    Oral   SpO2: 100% 99% 99%    Weight:       Height:         FHR:  Reassuring. Cervix: 4 cm / 70% / soft / -1. TOCO:  2 minutes-8 minutes. A:23 y.o. AA female at 38w11d [id number]  Pitocin augmentation for late and limited PNC  BS=9 on admission  GBS negative    Patient Active Problem List   Diagnosis    Grand multipara    Limited prenatal care in third trimester    Poor patient attendance of  care    Late prenatal care    39 weeks gestation of pregnancy    Iron deficiency anemia of pregnancy     Fetal status: Reassuring  GBS: negative    P: AROM at 0750 without difficulty using the [place]. Ruptured clear fluid. IV bolus/O2/position changes prn    Jerene Severe, M.D.  FACOG  2019 7:56 PM

## 2019-05-21 NOTE — PROGRESS NOTES
Pt seen and examined by BRUCE Paiz CNP. NST is reactive however pt is 3cm dilated. Will admit to L&D for labor per BRUCE Paiz CNP/Dr.St Alma Rosa Amos.

## 2019-05-21 NOTE — ANESTHESIA PRE PROCEDURE
Department of Anesthesiology  Preprocedure Note       Name:  Jarrod Sethi   Age:  21 y.o.  :  1995                                          MRN:  [id number]         Date:  2019      Surgeon: * No surgeons listed *    Procedure: Labor Analgesia    Medications prior to admission:   Prior to Admission medications    Medication Sig Start Date End Date Taking? Authorizing Provider   Prenatal MV-Min-FA-Omega-3 (PRENATAL GUMMIES/DHA & FA) 0.4-32.5 MG CHEW Take 1 tablet by mouth daily 3/21/19  Yes DINA Hill CNP       Current medications:    Current Facility-Administered Medications   Medication Dose Route Frequency Provider Last Rate Last Dose    lactated ringers infusion   Intravenous Continuous DINA Fox CNP        ondansetron (ZOFRAN) injection 4 mg  4 mg Intravenous Q6H PRN DINA Fox CNP        povidone-iodine (BETADINE) 10 % external solution             lidocaine 1 % injection             oxytocin (PITOCIN) 30 units in 500 mL infusion Override Pull             oxytocin (PITOCIN) 30 units in 500 mL infusion  1 nitin-units/min Intravenous Continuous Rodrigo Oh MD           Allergies:     Allergies   Allergen Reactions    Motrin [Ibuprofen] Hives     Pt states allergy is to over the counter only    Tylenol [Acetaminophen] Hives     Pt states allergy is to over the counter tylenol only       Problem List:    Patient Active Problem List   Diagnosis Code    [address] multipara Z64.1    Limited prenatal care in third trimester O09.33    Poor patient attendance of  care O09.30    37 weeks gestation of pregnancy Z3A.37    Late prenatal care O09.30    Multiparity Z64.1    39 weeks gestation of pregnancy Z3A.39       Past Medical History:        Diagnosis Date    Anemia     Asthma     childhood    Mental disorder     depression and anxiety    Postpartum depression        Past Surgical History:        Procedure Laterality Date    CHOLECYSTECTOMY  06/01/2017    Dr. Campos Felton ERCP  06/02/2017    Dr. Ginny Romero       Social History:    Social History     Tobacco Use    Smoking status: Current Every Day Smoker     Packs/day: 0.25     Types: Cigarettes    Smokeless tobacco: Never Used   Substance Use Topics    Alcohol use: No                                Ready to quit: Not Answered  Counseling given: Not Answered      Vital Signs (Current):   Vitals:    05/21/19 1027 05/21/19 1139 05/21/19 1145 05/21/19 1514   BP: (!) 109/58 114/65     Pulse: 95 83     Resp: 14 16     Temp: 36.7 °C (98 °F) 37.2 °C (99 °F)  37.1 °C (98.7 °F)   TempSrc: Oral Oral     Weight:   173 lb (78.5 kg)    Height:   5' 1\" (1.549 m)                                               BP Readings from Last 3 Encounters:   05/21/19 114/65   05/17/19 107/60   05/08/19 118/63       NPO Status:  Last solid consumption 5/21/18 @ 0830                         Last liquid consumption 5/21/18 @ 1400                                                      BMI:   Wt Readings from Last 3 Encounters:   05/21/19 173 lb (78.5 kg)   05/17/19 176 lb (79.8 kg)   05/08/19 160 lb (72.6 kg)     Body mass index is 32.69 kg/m². CBC:   Lab Results   Component Value Date    WBC 12.2 05/21/2019    RBC 5.46 05/21/2019    HGB 10.6 05/21/2019    HCT 34.8 05/21/2019    MCV 63.7 05/21/2019    RDW 19.6 05/21/2019     05/21/2019       CMP:   Lab Results   Component Value Date     10/03/2017    K 5.7 10/03/2017     10/03/2017    CO2 26 10/03/2017    BUN 8 10/03/2017    CREATININE 0.7 10/03/2017    GFRAA >60 10/03/2017    LABGLOM >60 10/03/2017    GLUCOSE 103 10/03/2017    PROT 7.3 09/21/2017    CALCIUM 8.6 10/03/2017    BILITOT 0.4 09/21/2017    ALKPHOS 62 09/21/2017    AST 21 09/21/2017    ALT 12 09/21/2017       POC Tests: No results for input(s): POCGLU, POCNA, POCK, POCCL, POCBUN, POCHEMO, POCHCT in the last 72 hours.     Coags:   Lab Results   Component Value Date    PROTIME 12.0 06/01/2017 INR 1.1 06/01/2017       HCG (If Applicable):   Lab Results   Component Value Date    PREGTESTUR positive 09/21/2017        ABGs: No results found for: PHART, PO2ART, XVA0JEW, TNH3VAA, BEART, H7PHMTBJ     Type & Screen (If Applicable):  No results found for: LABABO, [address]    Anesthesia Evaluation  Patient summary reviewed and Nursing notes reviewed no history of anesthetic complications:   Airway: Mallampati: II  TM distance: >3 FB   Neck ROM: full  Mouth opening: > = 3 FB Dental: normal exam     Comment: Pt denies any loose, chipped, or missing teeth. Pulmonary: breath sounds clear to auscultation  (+) asthma (pt stated she had asthma as a child, denies any recent issues or inhaler usage): current smoker (pt states to smoke 5 cigarettes per day)          Patient smoked on day of surgery. Cardiovascular:Negative CV ROS  Exercise tolerance: good (>4 METS),           Rhythm: regular  Rate: normal           Beta Blocker:  Not on Beta Blocker         Neuro/Psych:   (+) depression/anxiety             GI/Hepatic/Renal:   (+) GERD (pt states with pregnancy, controlled with TUMs): well controlled,           Endo/Other:    (+) blood dyscrasia (H & H 10.6 & 34.8 on 5/21): anemia:., .                 Abdominal:           Vascular: negative vascular ROS. Anesthesia Plan      general, spinal and epidural     ASA 3     (Discussed epidural and spinal risks/benefits, as well as GA as needed for back up. Questions answered. Pt states an understanding)        Anesthetic plan and risks discussed with patient. Use of blood products discussed with patient whom consented to blood products. Plan discussed with CRNA and attending.                   Cosme Wilhelm RN SRNA  5/21/2019

## 2019-05-21 NOTE — H&P
Department of Obstetrics and Gynecology  Nurse Practitioner Obstetrics History and Physical        CHIEF COMPLAINT:  Contractions and Scheduled NST    HISTORY OF PRESENT ILLNESS:    The patient is a 21 y.o. female [id number], Patient's last menstrual period was 2018 (approximate). ,  at 39w5d. Pt is a grand multipara with limited prenatal care, she was first seen in L&D at 42 weeks. She states she saw Dr. Livia Vargas in prior pregnancy but had difficulty getting appointments scheduled. She was seen by MFM and found to have elevated cord dopplers, which have since resolved with normal doppler testing. She was then instructed to follow up with the Clinic, however her first visit was on 19. Pt denies lof, vb, or decreased fm. She has been feeling ctx for the past 2 weeks and feel they have intensified.  She states she was not dilated on her last cervical exam.  OB History        6    Para   5    Term   5            AB        Living   5       SAB        TAB        Ectopic        Molar        Multiple   0    Live Births   5                Estimated Due Date: Estimated Date of Delivery: 19    PRENATAL CARE:  Limited prenatal care, grand multipara    Complicated by:   Patient Active Problem List   Diagnosis Code    Grand multipara Z64.1    Limited prenatal care in third trimester O09.33    Poor patient attendance of  care O09.30    37 weeks gestation of pregnancy Z3A.37    Late prenatal care O09.30    Multiparity Z64.1       PAST OB HISTORY  OB History        6    Para   5    Term   5            AB        Living   5       SAB        TAB        Ectopic        Molar        Multiple   0    Live Births   5                Past Medical History:        Diagnosis Date    Anemia     Asthma     childhood    Mental disorder     depression and anxiety    Postpartum depression      Past Surgical History:        Procedure Laterality Date    CHOLECYSTECTOMY  2017    Dr. Esther Forde  ERCP  06/02/2017    Dr. Patrica Jaime     Social History:    TOBACCO:   reports that she has been smoking cigarettes. She has been smoking about 0.25 packs per day. She has never used smokeless tobacco.  ETOH:   reports that she does not drink alcohol. DRUGS:   reports that she does not use drugs. Family History:       Problem Relation Age of Onset    Kidney Disease Father      Medications Prior to Admission:  Medications Prior to Admission: guaiFENesin (ROBITUSSIN) 100 MG/5ML liquid, Take 10 mLs by mouth 3 times daily as needed for Cough or Congestion  Prenatal MV-Min-FA-Omega-3 (PRENATAL GUMMIES/DHA & FA) 0.4-32.5 MG CHEW, Take 1 tablet by mouth daily  Allergies:  Motrin [ibuprofen] and Tylenol [acetaminophen]    Review of Systems:   Ears, nose, mouth, throat, and face: negative  Respiratory: negative  Cardiovascular: negative  Gastrointestinal: negative  Genitourinary:negative  Integument/breast: negative  Hematologic/lymphatic: negative  Musculoskeletal:negative  Neurological: negative  Behavioral/Psych: negative  Endocrine: negative  Allergic/Immunologic: negative  Psychosocial: negative    PHYSICAL EXAM:    General appearance:  awake, alert, cooperative, no apparent distress, and appears stated age  Neurologic:  Awake, alert, oriented to name, place and time. Cranial nerves II-XII are grossly intact.     Lungs:  clear to auscultation bilaterally  Heart:  regular rate and rhythm  Abdomen:   soft, non-distended, non-tender, no masses palpated, gravid  Fetal heart rate:  Baseline Heart Rate 130, accelerations:  present, decels:none  Pelvis:  External Genitalia: no lesions  Cervix:  DILATION:  3 cm  EFFACEMENT:   80%  STATION:  -2  CONSISTENCY:  soft    Contraction frequency:  3-6 minutes  Membranes:  Intact    BP (!) 109/58   Pulse 95   Temp 98 °F (36.7 °C) (Oral)   Resp 14   LMP 08/12/2018 (Approximate)                 Prenatal Labs  Blood Type/Rh: A pos  Antibody Screen: negative  Hemoglobin, Hematocrit, Platelets: 9.2 / 09.3 / 262 (4/29/2019)  Rubella: immune  T.  Pallidum, IgG: negative   Hepatitis B Surface Antigen: negative   HIV: non-reactive   Gonorrhea: negative  Chlamydia: negative  Group B Strep: negative        ASSESSMENT AND PLAN:  D/w Dr. Carly Anderson  Routine induction orders        Electronically signed by DINA Jaquez CNP on 5/21/2019 at 11:21 AM

## 2019-05-21 NOTE — ANESTHESIA PROCEDURE NOTES
Epidural Block    Patient location during procedure: OB  Reason for block: labor epidural  Staffing  Anesthesiologist: Tin Garcia MD  Resident/CRNA: Dereje Garcia APRN - CRNA  Other anesthesia staff: Ghulam Zabala RN  Performed: other anesthesia staff and resident/CRNA   Preanesthetic Checklist  Completed: patient identified, site marked, surgical consent, pre-op evaluation, timeout performed, IV checked, risks and benefits discussed, monitors and equipment checked, anesthesia consent given, oxygen available and patient being monitored  Epidural  Patient position: sitting  Prep: ChloraPrep  Patient monitoring: cardiac monitor, continuous pulse ox and frequent blood pressure checks  Approach: midline  Location: lumbar (1-5)  Injection technique: BENJAMIN air and BENJAMIN saline  Provider prep: mask and sterile gloves  Needle  Needle type: Tuohy   Needle gauge: 18 G  Needle length: 3.5 in  Needle insertion depth: 7 cm  Catheter type: end hole  Catheter size: 20 G.   Catheter at skin depth: 10 cm  Test dose: negative  Assessment  Hemodynamics: stable  Attempts: 2

## 2019-05-22 PROCEDURE — 6370000000 HC RX 637 (ALT 250 FOR IP): Performed by: OBSTETRICS & GYNECOLOGY

## 2019-05-22 PROCEDURE — 51701 INSERT BLADDER CATHETER: CPT

## 2019-05-22 PROCEDURE — 10907ZC DRAINAGE OF AMNIOTIC FLUID, THERAPEUTIC FROM PRODUCTS OF CONCEPTION, VIA NATURAL OR ARTIFICIAL OPENING: ICD-10-PCS | Performed by: OBSTETRICS & GYNECOLOGY

## 2019-05-22 PROCEDURE — 1220000000 HC SEMI PRIVATE OB R&B

## 2019-05-22 PROCEDURE — 6360000002 HC RX W HCPCS: Performed by: OBSTETRICS & GYNECOLOGY

## 2019-05-22 PROCEDURE — 7200000001 HC VAGINAL DELIVERY

## 2019-05-22 PROCEDURE — 2580000003 HC RX 258: Performed by: NURSE PRACTITIONER

## 2019-05-22 PROCEDURE — 3E033VJ INTRODUCTION OF OTHER HORMONE INTO PERIPHERAL VEIN, PERCUTANEOUS APPROACH: ICD-10-PCS | Performed by: OBSTETRICS & GYNECOLOGY

## 2019-05-22 RX ORDER — KETOROLAC TROMETHAMINE 30 MG/ML
30 INJECTION, SOLUTION INTRAMUSCULAR; INTRAVENOUS ONCE
Status: COMPLETED | OUTPATIENT
Start: 2019-05-22 | End: 2019-05-22

## 2019-05-22 RX ORDER — CALCIUM CARBONATE 300MG(750)
1 TABLET,CHEWABLE ORAL DAILY
Status: DISCONTINUED | OUTPATIENT
Start: 2019-05-22 | End: 2019-05-23 | Stop reason: HOSPADM

## 2019-05-22 RX ORDER — FERROUS SULFATE 325(65) MG
325 TABLET ORAL 2 TIMES DAILY WITH MEALS
Status: DISCONTINUED | OUTPATIENT
Start: 2019-05-22 | End: 2019-05-23 | Stop reason: HOSPADM

## 2019-05-22 RX ORDER — LANOLIN 100 %
OINTMENT (GRAM) TOPICAL PRN
Status: DISCONTINUED | OUTPATIENT
Start: 2019-05-22 | End: 2019-05-23 | Stop reason: HOSPADM

## 2019-05-22 RX ORDER — DOCUSATE SODIUM 100 MG/1
100 CAPSULE, LIQUID FILLED ORAL 2 TIMES DAILY
Status: DISCONTINUED | OUTPATIENT
Start: 2019-05-22 | End: 2019-05-23 | Stop reason: HOSPADM

## 2019-05-22 RX ADMIN — DOCUSATE SODIUM 100 MG: 100 CAPSULE, LIQUID FILLED ORAL at 08:57

## 2019-05-22 RX ADMIN — KETOROLAC TROMETHAMINE 30 MG: 30 INJECTION, SOLUTION INTRAMUSCULAR; INTRAVENOUS at 08:56

## 2019-05-22 RX ADMIN — SODIUM CHLORIDE, POTASSIUM CHLORIDE, SODIUM LACTATE AND CALCIUM CHLORIDE: 600; 310; 30; 20 INJECTION, SOLUTION INTRAVENOUS at 02:18

## 2019-05-22 RX ADMIN — Medication: at 04:00

## 2019-05-22 RX ADMIN — KETOROLAC TROMETHAMINE 30 MG: 30 INJECTION, SOLUTION INTRAMUSCULAR; INTRAVENOUS at 15:47

## 2019-05-22 RX ADMIN — DOCUSATE SODIUM 100 MG: 100 CAPSULE, LIQUID FILLED ORAL at 23:58

## 2019-05-22 ASSESSMENT — PAIN SCALES - GENERAL
PAINLEVEL_OUTOF10: 0
PAINLEVEL_OUTOF10: 7
PAINLEVEL_OUTOF10: 7

## 2019-05-22 NOTE — PROGRESS NOTES
RN and CRNA at bedside, IV fluid bolus, patient supine. Some VDs from patient crying in fetal position.

## 2019-05-22 NOTE — PROGRESS NOTES
House officer aware of decels which resolved, epidural bolus, maternal hypotension, and pitocin stoppped. No new orders at this time.

## 2019-05-22 NOTE — PROGRESS NOTES
Mecca Duff was ordered Prenatal MV-Min-FA-Omega-3 (PRENATAL GUMMIES/DHA & FA) which is a nonformulary medication. The patient has indicated that the home supply of this medication will be brought in to the hospital for inpatient use. If the medication has not been administered by 1400 on the following day from the time the order was placed, a pharmacist will follow-up with the nurse of the patient to assess the capability of the patient to bring in the medication. If it is determined that the patient cannot supply the medication and it is not available to be dispensed from the pharmacy, a call will be placed to the ordering provider to discuss alternative options.     Ken Gracia, [facility]  5/22/2019  5:25 AM

## 2019-05-22 NOTE — LACTATION NOTE
Pt sleeping when I entered the room for rounds and she requested I come back tomorrow. Did request elec breast pump for home use to increase supply.

## 2019-05-22 NOTE — PROGRESS NOTES
Patient up to BR with assistance x 1. Patient voided. Laura area cleaned and new pads, ice, panties, and new gown applied.

## 2019-05-22 NOTE — CARE COORDINATION
SW Discharge Planning   SW received consult due to limited prenatal care and [facility] usage during pregnancy     SW met with Carolina Barron ( 11/19/95) ( [phone]) mother to baby Margo Jurado ( 5/22/19). Isaías Barronkarandenny reported father to be Shakila Valentine ( 8/6/96) and reported that she resides with him, her son from a previous marriage,  Inder Verdin 9, and their children together, Shakila Valentine !! Age 3, Jaradlubnakaity Marco A age 1, Lilly Figueroai 2, and Smith Joe age 3. Isaías Martinez reported that both she and Kisha Nagy are currently unemployed and that baby will be added to their [employer] Isaías Martinez reported having all needed infant items for baby including a bassinet and car seat. NELLY discussed Renee's limited prenatal care. Per Isaías Martinez, she attempted multiple times to be seen in Dr. Qasim Butler office, however stated that they never returned her calls. Isaías Martinez reported that she felt over whelmed caring for the other children, and was considering adoption of this baby. Isaías Martinez reported that Dr. Kyung Bautista will follow up with pediatric care. Isaías Martinez denied any current or past history of domestic violence or legal history. Isaías Martinez did report having anxiety and depression and reported that she recently made a counseling appointment with [facility]. Per Isaías Martinez, [facility] hs been previously involved, once when Jefm  left the house and walked to school by himself at age 10, and when Ej Clemons was born due to [facility] usage during pregnancy. Tish Garzon stated that she could not recall her last usage, and stated understanding for he need for a [facility] referral.     NELLY completed [facility] referral to Clifford Gowers in intake ( [phone]).     PLAN    Baby can NOT be discharged home until [facility] provides disposition  SW to continue communication with [facility] ( [phone])     Electronically signed by Adelfo Mejias [facility] on 5/22/2019 at 4:08 PM

## 2019-05-22 NOTE — L&D DELIVERY NOTE
Department of Obstetrics and Gynecology  Spontaneous Vaginal Delivery Note    Patient:  Carlito Wong     Admit Date:  2019  9:49 AM  Medical Record Number:  [id number]   Procedure Date: 2019 4:24 AM     Pre-delivery Diagnosis:  IUP at 39 weeks 6 days, induction of labor for late, limited prenatal care  Patient Active Problem List   Diagnosis    Grand multipara    Limited prenatal care in third trimester    Poor patient attendance of  care    Late prenatal care    39 weeks gestation of pregnancy    Iron deficiency anemia of pregnancy     Post-delivery Diagnosis:  Living  infant(s) and Female  Patient Active Problem List   Diagnosis    Grand multipara    Limited prenatal care in third trimester    Poor patient attendance of  care    Late prenatal care    39 weeks gestation of pregnancy    Iron deficiency anemia of pregnancy     (normal spontaneous vaginal delivery)    Term birth of female      Information for the patient's :  Maddie Hankinsour Pending  Jose Sauers [[id number]]   Normal spontaneous vaginal delivery - uncomplicated    Delivering clinician: Dr. Alyssa Hunter:   Information for the patient's :  Alma Rosaadam Lunsford Pending  Renee [[id number]]   6 lbs. 15 oz.   3150 grams     APGARS:     Information for the patient's :  Maddie Lunsford Pending  Jose Sauers [[id number]]   1 minute: 9  5 minute: 9    Anesthesia:  epidural anesthesia    Application and Delivery:  21 y.o. AA female [id number] at 39w6d admitted for induction with a Bucio score of 9 secondary to late and limited prenatal care who progressed to complete with Pitocin and amniotomy and delivered Cephalic, right occiput anterior presentation via  @ 0407, under epidural anesthesia anesthesia,  over an intact perineum without a resulting laceration, without dystocia or complication, a Live Born Female infant, weighing 6# 15oz, 3150 grams, Clear fluid at delivery, bulb suctioned on perineum. A nuchal cord was not present. Spontaneous cry,  Apgar's 1 minute:  9; 5 minute:  9. The placenta was delivered with gentle traction and was noted to be intact, whole and that the umbilical cord had three vessels noted. Episiotomy was not needed. Repair not necessary. Cervix, rectum, sphincter intact. Sponge, needle, and instrument counts correct x 2.     Delivery Summary:  Mother's Information    Labor Events     labor?:  No  Rupture type:  Artificial=AROM, Intact  Fluid color:  Clear     Mother Delivery Information    Episiotomy:  None  Lacerations:  None  Repair Suture:  None  Vaginal Delivery Est. Blood Loss (mL):  150  Surgical or Additional Est. Blood Loss (mL):  0 (View Only):  Edit in Flowsheets   Combined Est. Blood Loss (mL):  150        Johanthan Mahmood [[id number]]    Labor Events     labor?:  No  Cervical ripening date/time:     Antibiotics received during labor?:  No  Rupture date/time: 19 19:50:00   Rupture type:  Artificial=AROM, Intact  Fluid color:  Clear  Fluid odor:  None  Induction:  Oxytocin  Augmentation:  AROM  Indications for augmentation:  Ineffective Contraction Pattern  Labor complications:  None          Labor Event Times    Labor onset date/time: 19 2100   Dilation complete date/time:   190   Start pushin2019 0400      Anesthesia    Method:  Epidural     Assisted Delivery Details    Forceps attempted?:  No  Vacuum extractor attempted?:  No     Shoulder Dystocia    Shoulder dystocia present?:  No      Presentation    Presentation:  Vertex  Position:  Right  _:  Occiput  _:  Anterior     Gilmer Information    Head delivery date/time:  2019 04:07:00   Changing the 's delivery date/time could affect patient care.:     Delivery date/time:   19 0407   Delivery type:  Vaginal, Spontaneous     Delivery Providers    Delivering clinician:  Arie Hodgkin, MD   Provider Role    Carson Trejo RN Delivery Nurse    Leatha Lucas, RN Registered Nurse      Cord    Vessels:  3 Vessels  Complications:  None  Delayed cord clamping?:  Yes  Cord clamped date/time:  2019 0408  Cord blood disposition:  Lab  Gases sent?:  Yes  Stem cell collection (by provider): No     Placenta    Date/time:  2019 04:13:00  Removal:  Spontaneous  Appearance:  Intact  Disposition:  Refrigerator     Delivery Resuscitation    Method:  Bulb Suction, Stimulation     Apgars    Living status:  Living  Apgars   1 Minute:   5 Minute:   10 Minute 15 Minute 20 Minute   Skin Color: 1  1       Heart Rate: 2  2       Reflex Irritability: 2  2       Muscle Tone: 2  2       Respiratory Effort: 2  2       Total: 9  9               Apgars Assigned By:  Shabana Hairston     Skin to Skin    Skin to skin initiation date/time: 19 04:08:00   Skin to skin with:   Mother  Skin to skin end date/time: 19 04:18:00       Measurements    Weight:  3147 g Length:  50.8 cm   Head circumference:  32 cm     Security tag #:  [id number]      Delivery Information    Episiotomy:  None  Perineal lacerations:  None    Vaginal laceration:  No    Cervical laceration:  No    Vaginal delivery est. blood loss (mL):  150  Surgical or additional est. blood loss (mL):  0 (View Only):  Edit in Flowsheets   Combined est. blood loss (mL):  150  Repair suture:  None     Vaginal Delivery Counts    Initial count personnel:  MARIBETH HORTA   4x4:   Needles:   Instruments:   Lap Pads:   Sponges:     Initial counts:          Final counts:          Final count personnel:  Candido Alex  Final count verified by:  MARIBETH  Accurate final count?:  Yes  Final vaginal sweep completed:  Yes     Other Procedures    Procedures:  None     Labor Length    1st stage:  6h 50m  2nd stage:  0h 17m  3rd stage:  0h 06m    Specimen:  None      Estimated blood loss:         Condition:  infant stable to general nursery and mother stable to maternity    Blood Type and Rh: A POS    Rubella Immunity Status:   Immune Infant Feeding:    both breast and bottle    Attending Attestation: I performed the procedure.     Alicia Hui MD, [address] [facility]  5/22/2019 4:24 AM

## 2019-05-22 NOTE — ANESTHESIA POSTPROCEDURE EVALUATION
Department of Anesthesiology  Postprocedure Note    Patient: Tamika Mohamud  MRN: [id number]  YOB: 1995  Date of evaluation: 5/22/2019  Time:  10:06 AM     Procedure Summary     Date:  05/21/19 Room / Location:      Anesthesia Start:  1702 Anesthesia Stop:  05/22/19 0407    Procedure:  Labor Analgesia Diagnosis:      Scheduled Providers:   Responsible Provider:  Jailene Torres MD    Anesthesia Type:  general, spinal, epidural ASA Status:  3          Anesthesia Type: general, spinal, epidural    Anabell Phase I: Anabell Score: 10    Anabell Phase II:      Last vitals: Reviewed and per EMR flowsheets.        Anesthesia Post Evaluation    Patient location during evaluation: bedside  Patient participation: complete - patient participated  Level of consciousness: awake  Pain score: 1  Airway patency: patent  Nausea & Vomiting: no nausea and no vomiting  Complications: no  Cardiovascular status: hemodynamically stable  Respiratory status: acceptable  Hydration status: euvolemic

## 2019-05-23 VITALS
WEIGHT: 173 LBS | DIASTOLIC BLOOD PRESSURE: 66 MMHG | HEIGHT: 61 IN | OXYGEN SATURATION: 99 % | RESPIRATION RATE: 16 BRPM | SYSTOLIC BLOOD PRESSURE: 109 MMHG | BODY MASS INDEX: 32.66 KG/M2 | TEMPERATURE: 97.7 F | HEART RATE: 76 BPM

## 2019-05-23 PROBLEM — O99.019 IRON DEFICIENCY ANEMIA OF PREGNANCY: Status: RESOLVED | Noted: 2019-05-21 | Resolved: 2019-05-23

## 2019-05-23 PROBLEM — D50.9 IRON DEFICIENCY ANEMIA OF PREGNANCY: Status: RESOLVED | Noted: 2019-05-21 | Resolved: 2019-05-23

## 2019-05-23 PROBLEM — Z3A.39 39 WEEKS GESTATION OF PREGNANCY: Status: RESOLVED | Noted: 2019-05-21 | Resolved: 2019-05-23

## 2019-05-23 PROBLEM — O09.30 POOR PATIENT ATTENDANCE OF ANTENATAL CARE: Status: RESOLVED | Noted: 2019-04-29 | Resolved: 2019-05-23

## 2019-05-23 PROBLEM — O09.30 LATE PRENATAL CARE: Status: RESOLVED | Noted: 2019-05-06 | Resolved: 2019-05-23

## 2019-05-23 PROCEDURE — 6370000000 HC RX 637 (ALT 250 FOR IP): Performed by: OBSTETRICS & GYNECOLOGY

## 2019-05-23 RX ORDER — KETOROLAC TROMETHAMINE 10 MG/1
10 TABLET, FILM COATED ORAL EVERY 6 HOURS PRN
Status: COMPLETED | OUTPATIENT
Start: 2019-05-23 | End: 2019-05-23

## 2019-05-23 RX ADMIN — DOCUSATE SODIUM 100 MG: 100 CAPSULE, LIQUID FILLED ORAL at 09:11

## 2019-05-23 RX ADMIN — KETOROLAC TROMETHAMINE 10 MG: 10 TABLET, FILM COATED ORAL at 01:09

## 2019-05-23 RX ADMIN — KETOROLAC TROMETHAMINE 10 MG: 10 TABLET, FILM COATED ORAL at 09:12

## 2019-05-23 ASSESSMENT — PAIN SCALES - GENERAL
PAINLEVEL_OUTOF10: 8
PAINLEVEL_OUTOF10: 7

## 2019-05-23 NOTE — PROGRESS NOTES
Dr Sara Simmons notified of pt feeling pain in \"uterus\" 9/10. Requesting pain medicine. Dr said pt is to discharge home.

## 2019-05-23 NOTE — DISCHARGE SUMMARY
Obstetrical Discharge Form    Primary OB Clinician: Carline Marr  Postpartum  Day #1    Gestational Age at time of delivery: 45.10    Date of Delivery: 19    Delivery Type: spontaneous vaginal delivery    Baby: Liveborn female,     Intrapartum complications: None    Laceration: none    Episiotomy: none,    Feeding method: both breast and bottle     Discharge Date: 19 if ok with peds for baby to be discharged    Condition: stable  Plan:     Follow-up appointment with Dr. Carline Marr in 6 weeks.

## 2019-05-23 NOTE — LACTATION NOTE
Pt not in the room when lactation rounds were done. MD has not signed order for elec breast pump yet.

## 2019-05-28 NOTE — CARE COORDINATION
SW Discharge Planning     SW noted baby's cord to be positive for THC. The rest of baby's cord is not back yet, and if the results of those cord stat tests come back positive only, then SW will add to [facility] referral.   NELLY notified [facility] , Diann Chamorro ( [phone]) of positive cord stat result for THC.     Electronically signed by JUAN FRANCISCO Berrios on 5/28/2019 at 8:43 AM

## 2019-05-30 NOTE — CARE COORDINATION
SW Discharge Planning     SW noted that baby's cord was positive for THC, Tramadol, N-desmethyltramado and O-desmethyltramadol. [facility] ( [phone][facility] , maxime MCCOLLUM And provided him with the cord results.      Electronically signed by JUAN FRANCISCO Steinberg on 5/30/2019 at 11:30 AM

## 2019-06-14 ENCOUNTER — APPOINTMENT (OUTPATIENT)
Dept: ULTRASOUND IMAGING | Age: 24
End: 2019-06-14
Payer: MEDICAID

## 2019-06-14 ENCOUNTER — HOSPITAL ENCOUNTER (EMERGENCY)
Age: 24
Discharge: HOME | End: 2019-06-14
Attending: EMERGENCY MEDICINE
Payer: MEDICAID

## 2019-06-14 ENCOUNTER — APPOINTMENT (OUTPATIENT)
Dept: CT IMAGING | Age: 24
End: 2019-06-14
Payer: MEDICAID

## 2019-06-14 ENCOUNTER — APPOINTMENT (OUTPATIENT)
Dept: GENERAL RADIOLOGY | Age: 24
End: 2019-06-14
Payer: MEDICAID

## 2019-06-14 VITALS
DIASTOLIC BLOOD PRESSURE: 59 MMHG | TEMPERATURE: 99.1 F | HEIGHT: 62 IN | WEIGHT: 160 LBS | OXYGEN SATURATION: 98 % | BODY MASS INDEX: 29.44 KG/M2 | HEART RATE: 82 BPM | RESPIRATION RATE: 16 BRPM | SYSTOLIC BLOOD PRESSURE: 101 MMHG

## 2019-06-14 DIAGNOSIS — J06.9 ACUTE UPPER RESPIRATORY INFECTION: ICD-10-CM

## 2019-06-14 DIAGNOSIS — N30.01 ACUTE CYSTITIS WITH HEMATURIA: Primary | ICD-10-CM

## 2019-06-14 LAB
ALBUMIN SERPL-MCNC: 3.8 G/DL (ref 3.5–5.2)
ALP BLD-CCNC: 91 U/L (ref 35–104)
ALT SERPL-CCNC: 11 U/L (ref 0–32)
ANION GAP SERPL CALCULATED.3IONS-SCNC: 15 MMOL/L (ref 7–16)
AST SERPL-CCNC: 22 U/L (ref 0–31)
BACTERIA: ABNORMAL /HPF
BILIRUB SERPL-MCNC: 0.6 MG/DL (ref 0–1.2)
BILIRUBIN URINE: ABNORMAL
BLOOD, URINE: ABNORMAL
BUN BLDV-MCNC: 8 MG/DL (ref 6–20)
CALCIUM SERPL-MCNC: 9.1 MG/DL (ref 8.6–10.2)
CHLORIDE BLD-SCNC: 97 MMOL/L (ref 98–107)
CLARITY: ABNORMAL
CO2: 22 MMOL/L (ref 22–29)
COLOR: ABNORMAL
CREAT SERPL-MCNC: 0.9 MG/DL (ref 0.5–1)
EKG ATRIAL RATE: 100 BPM
EKG P AXIS: 71 DEGREES
EKG P-R INTERVAL: 146 MS
EKG Q-T INTERVAL: 338 MS
EKG QRS DURATION: 72 MS
EKG QTC CALCULATION (BAZETT): 436 MS
EKG R AXIS: 29 DEGREES
EKG T AXIS: 51 DEGREES
EKG VENTRICULAR RATE: 100 BPM
EPITHELIAL CELLS, UA: ABNORMAL /HPF
GFR AFRICAN AMERICAN: >60
GFR NON-AFRICAN AMERICAN: >60 ML/MIN/1.73
GLUCOSE BLD-MCNC: 133 MG/DL (ref 74–99)
GLUCOSE URINE: NEGATIVE MG/DL
HCG, URINE, POC: POSITIVE
HCT VFR BLD CALC: 39.2 % (ref 34–48)
HEMOGLOBIN: 12.3 G/DL (ref 11.5–15.5)
INFLUENZA A BY PCR: NOT DETECTED
INFLUENZA B BY PCR: NOT DETECTED
KETONES, URINE: 15 MG/DL
LACTIC ACID: 2 MMOL/L (ref 0.5–2.2)
LEUKOCYTE ESTERASE, URINE: ABNORMAL
Lab: NORMAL
MCH RBC QN AUTO: 19.5 PG (ref 26–35)
MCHC RBC AUTO-ENTMCNC: 31.4 % (ref 32–34.5)
MCV RBC AUTO: 62 FL (ref 80–99.9)
NEGATIVE QC PASS/FAIL: NORMAL
NITRITE, URINE: NEGATIVE
PDW BLD-RTO: 22 FL (ref 11.5–15)
PH UA: 6 (ref 5–9)
PLATELET # BLD: 292 E9/L (ref 130–450)
PMV BLD AUTO: ABNORMAL FL (ref 7–12)
POSITIVE QC PASS/FAIL: NORMAL
POTASSIUM SERPL-SCNC: 3.5 MMOL/L (ref 3.5–5)
PROTEIN UA: >=300 MG/DL
RBC # BLD: 6.32 E12/L (ref 3.5–5.5)
RBC UA: ABNORMAL /HPF (ref 0–2)
SODIUM BLD-SCNC: 134 MMOL/L (ref 132–146)
SPECIFIC GRAVITY UA: 1.02 (ref 1–1.03)
TOTAL PROTEIN: 8 G/DL (ref 6.4–8.3)
UROBILINOGEN, URINE: >=8 E.U./DL
WBC # BLD: 17.1 E9/L (ref 4.5–11.5)
WBC UA: ABNORMAL /HPF (ref 0–5)

## 2019-06-14 PROCEDURE — 81001 URINALYSIS AUTO W/SCOPE: CPT

## 2019-06-14 PROCEDURE — 71275 CT ANGIOGRAPHY CHEST: CPT

## 2019-06-14 PROCEDURE — 87149 DNA/RNA DIRECT PROBE: CPT

## 2019-06-14 PROCEDURE — 87186 SC STD MICRODIL/AGAR DIL: CPT

## 2019-06-14 PROCEDURE — 85027 COMPLETE CBC AUTOMATED: CPT

## 2019-06-14 PROCEDURE — 6370000000 HC RX 637 (ALT 250 FOR IP): Performed by: PHYSICIAN ASSISTANT

## 2019-06-14 PROCEDURE — 6360000002 HC RX W HCPCS: Performed by: PHYSICIAN ASSISTANT

## 2019-06-14 PROCEDURE — 76856 US EXAM PELVIC COMPLETE: CPT

## 2019-06-14 PROCEDURE — 2580000003 HC RX 258: Performed by: PHYSICIAN ASSISTANT

## 2019-06-14 PROCEDURE — 83605 ASSAY OF LACTIC ACID: CPT

## 2019-06-14 PROCEDURE — 80053 COMPREHEN METABOLIC PANEL: CPT

## 2019-06-14 PROCEDURE — 71046 X-RAY EXAM CHEST 2 VIEWS: CPT

## 2019-06-14 PROCEDURE — 87077 CULTURE AEROBIC IDENTIFY: CPT

## 2019-06-14 PROCEDURE — 99284 EMERGENCY DEPT VISIT MOD MDM: CPT

## 2019-06-14 PROCEDURE — 87502 INFLUENZA DNA AMP PROBE: CPT

## 2019-06-14 PROCEDURE — 93010 ELECTROCARDIOGRAM REPORT: CPT | Performed by: INTERNAL MEDICINE

## 2019-06-14 PROCEDURE — 93005 ELECTROCARDIOGRAM TRACING: CPT | Performed by: PHYSICIAN ASSISTANT

## 2019-06-14 PROCEDURE — 36415 COLL VENOUS BLD VENIPUNCTURE: CPT

## 2019-06-14 PROCEDURE — 96374 THER/PROPH/DIAG INJ IV PUSH: CPT

## 2019-06-14 PROCEDURE — 6360000004 HC RX CONTRAST MEDICATION: Performed by: RADIOLOGY

## 2019-06-14 PROCEDURE — 87040 BLOOD CULTURE FOR BACTERIA: CPT

## 2019-06-14 PROCEDURE — 2580000003 HC RX 258: Performed by: RADIOLOGY

## 2019-06-14 PROCEDURE — 74177 CT ABD & PELVIS W/CONTRAST: CPT

## 2019-06-14 PROCEDURE — 87088 URINE BACTERIA CULTURE: CPT

## 2019-06-14 RX ORDER — SODIUM CHLORIDE 0.9 % (FLUSH) 0.9 %
10 SYRINGE (ML) INJECTION ONCE
Status: COMPLETED | OUTPATIENT
Start: 2019-06-14 | End: 2019-06-14

## 2019-06-14 RX ORDER — ACETAMINOPHEN 500 MG
1000 TABLET ORAL ONCE
Status: COMPLETED | OUTPATIENT
Start: 2019-06-14 | End: 2019-06-14

## 2019-06-14 RX ORDER — 0.9 % SODIUM CHLORIDE 0.9 %
1000 INTRAVENOUS SOLUTION INTRAVENOUS ONCE
Status: COMPLETED | OUTPATIENT
Start: 2019-06-14 | End: 2019-06-14

## 2019-06-14 RX ORDER — BENZONATATE 100 MG/1
100-200 CAPSULE ORAL 3 TIMES DAILY PRN
Qty: 20 CAPSULE | Refills: 0 | Status: SHIPPED | OUTPATIENT
Start: 2019-06-14

## 2019-06-14 RX ORDER — SULFAMETHOXAZOLE AND TRIMETHOPRIM 800; 160 MG/1; MG/1
1 TABLET ORAL 2 TIMES DAILY
Qty: 20 TABLET | Refills: 0 | Status: SHIPPED | OUTPATIENT
Start: 2019-06-14 | End: 2019-06-24

## 2019-06-14 RX ORDER — DIPHENHYDRAMINE HYDROCHLORIDE 50 MG/ML
25 INJECTION INTRAMUSCULAR; INTRAVENOUS ONCE
Status: COMPLETED | OUTPATIENT
Start: 2019-06-14 | End: 2019-06-14

## 2019-06-14 RX ADMIN — IOPAMIDOL 110 ML: 755 INJECTION, SOLUTION INTRAVENOUS at 19:11

## 2019-06-14 RX ADMIN — SODIUM CHLORIDE 1000 ML: 9 INJECTION, SOLUTION INTRAVENOUS at 16:15

## 2019-06-14 RX ADMIN — ACETAMINOPHEN 1000 MG: 500 TABLET ORAL at 17:16

## 2019-06-14 RX ADMIN — Medication 10 ML: at 19:11

## 2019-06-14 RX ADMIN — DIPHENHYDRAMINE HYDROCHLORIDE 25 MG: 50 INJECTION, SOLUTION INTRAMUSCULAR; INTRAVENOUS at 17:16

## 2019-06-14 ASSESSMENT — PAIN DESCRIPTION - PAIN TYPE: TYPE: ACUTE PAIN

## 2019-06-14 ASSESSMENT — PAIN SCALES - GENERAL
PAINLEVEL_OUTOF10: 0
PAINLEVEL_OUTOF10: 7
PAINLEVEL_OUTOF10: 5

## 2019-06-14 ASSESSMENT — PAIN DESCRIPTION - LOCATION: LOCATION: GENERALIZED

## 2019-06-14 NOTE — ED PROVIDER NOTES
ED Attending  CC: Juli       Department of Emergency Medicine   ED  Provider Note  Admit Date/RoomTime: 6/14/2019  3:52 PM  ED Room: 22/22   Chief Complaint   Cough (since Sunday; productive cough); Generalized Body Aches; and Dysuria (x 1 week )    History of Present Illness   Source of history provided by:  patient. History/Exam Limitations: none. Malissa Roman is a 21 y.o. old female who has a past medical history of:   Past Medical History:   Diagnosis Date    Anemia     Asthma     childhood    Mental disorder     depression and anxiety    Postpartum depression     presents to the emergency department by private vehicle, with complaints of sudden onset productive cough with sputum described as yellow and wheezing which began 5 day(s) prior to arrival.  The symptoms are associated with fever, chills, night sweats, nasal congestion, rhinorrhea and burning with urination and denies abdominal pain, calf pain, chest pain, dizziness, leg swelling, shortness of breath, slow heart beat or syncope. She has prior history of asthma and occasional episodes of bronchitis in the past.  Since onset the symptoms have been persistent and worsening and moderate in severity. The symptoms are aggravated by nothing and relieved by nothing. Patient states she has 6 children at home and recently delivered 3 weeks prior here at this hospital without any complications. Patient is having no postpartum complications such as elevated blood pressure, diffuse bilateral leg swelling, heavy vaginal bleeding or any type of uterine pain or tenderness. Patient states that her kids are sick at home as well. Patient states she has not taken anything for this at this time. Patient is not currently breast-feeding and patient denies any recent intercourse since delivery. ROS   Pertinent positives and negatives are stated within HPI, all other systems reviewed and are negative.     Past Surgical History:   Procedure Laterality Date    CHOLECYSTECTOMY  06/01/2017    Dr. Kathe Warner ERCP  06/02/2017    Dr. Patrica Jaime   Social History:  reports that she has been smoking cigarettes. She has been smoking about 0.25 packs per day. She has never used smokeless tobacco. She reports that she has current or past drug history. Drug: Marijuana. She reports that she does not drink alcohol. Family History: family history includes Kidney Disease in her father. Allergies: Motrin [ibuprofen] and Tylenol [acetaminophen]    Physical Exam           ED Triage Vitals [06/14/19 1548]   BP Temp Temp Source Pulse Resp SpO2 Height Weight   113/63 100.4 °F (38 °C) Oral 132 14 96 % 5' 2\" (1.575 m) 160 lb (72.6 kg)      Oxygen Saturation Interpretation: Normal.    Constitutional:  Alert, development consistent with age. Patient is profusely sweating upon examination  HEENT:  NC/NT. Airway patent. No retropharyngeal or peritonsillar abscess  Neck:  Normal ROM. Supple. No midline neck tenderness or stiffness  Respiratory:  Breath sounds: equal bilaterally. Lung sounds: wheezing- upper right and upper left. CV:  Tachy rate and rhythm, normal heart sounds, without pathological murmurs, ectopy, gallops, or rubs. .  GI:  Abdomen Soft, nontender, good bowel sounds. No firm or pulsatile mass. Integument:  Normal turgor. Warm, dry, without visible rash. Lymphatic:  Edema:  none Bilateral lower extremity(s). Neurological:  Oriented. Motor functions intact.     Lab / Imaging Results   (All laboratory and radiology results have been personally reviewed by myself)  Labs:  Results for orders placed or performed during the hospital encounter of 06/14/19   Rapid influenza A/B antigens   Result Value Ref Range    Influenza A by PCR Not Detected Not Detected    Influenza B by PCR Not Detected Not Detected   CBC   Result Value Ref Range    WBC 17.1 (H) 4.5 - 11.5 E9/L    RBC 6.32 (H) 3.50 - 5.50 E12/L    Hemoglobin 12.3 11.5 - 15.5 g/dL    Hematocrit 39.2 34.0 - 48.0 % MCV 62.0 (L) 80.0 - 99.9 fL    MCH 19.5 (L) 26.0 - 35.0 pg    MCHC 31.4 (L) 32.0 - 34.5 %    RDW 22.0 (H) 11.5 - 15.0 fL    Platelets 724 441 - 665 E9/L    MPV NOT CALC 7.0 - 12.0 fL   Comprehensive metabolic panel   Result Value Ref Range    Sodium 134 132 - 146 mmol/L    Potassium 3.5 3.5 - 5.0 mmol/L    Chloride 97 (L) 98 - 107 mmol/L    CO2 22 22 - 29 mmol/L    Anion Gap 15 7 - 16 mmol/L    Glucose 133 (H) 74 - 99 mg/dL    BUN 8 6 - 20 mg/dL    CREATININE 0.9 0.5 - 1.0 mg/dL    GFR Non-African American >60 >=60 mL/min/1.73    GFR African American >60     Calcium 9.1 8.6 - 10.2 mg/dL    Total Protein 8.0 6.4 - 8.3 g/dL    Alb 3.8 3.5 - 5.2 g/dL    Total Bilirubin 0.6 0.0 - 1.2 mg/dL    Alkaline Phosphatase 91 35 - 104 U/L    ALT 11 0 - 32 U/L    AST 22 0 - 31 U/L   Lactic Acid, Plasma   Result Value Ref Range    Lactic Acid 2.0 0.5 - 2.2 mmol/L   Urinalysis   Result Value Ref Range    Color, UA DARK YELLOW (A) Straw/Yellow    Clarity, UA CLOUDY (A) Clear    Glucose, Ur Negative Negative mg/dL    Bilirubin Urine MODERATE (A) Negative    Ketones, Urine 15 (A) Negative mg/dL    Specific Gravity, UA 1.020 1.005 - 1.030    Blood, Urine LARGE (A) Negative    pH, UA 6.0 5.0 - 9.0    Protein, UA >=300 (A) Negative mg/dL    Urobilinogen, Urine >=8.0 (A) <2.0 E.U./dL    Nitrite, Urine Negative Negative    Leukocyte Esterase, Urine MODERATE (A) Negative   Microscopic Urinalysis   Result Value Ref Range    WBC, UA PACKED 0 - 5 /HPF    RBC, UA NONE 0 - 2 /HPF    Epi Cells MODERATE /HPF    Bacteria, UA MANY (A) /HPF   POC Pregnancy Urine Qual   Result Value Ref Range    HCG, Urine, POC Positive Negative    Lot Number [id number]     Positive QC Pass/Fail Pass     Negative QC Pass/Fail Pass    EKG 12 Lead   Result Value Ref Range    Ventricular Rate 100 BPM    Atrial Rate 100 BPM    P-R Interval 146 ms    QRS Duration 72 ms    Q-T Interval 338 ms    QTc Calculation (Bazett) 436 ms    P Axis 71 degrees    R Axis 29 degrees    T Axis 51 degrees     Imaging: All Radiology results interpreted by Radiologist unless otherwise noted. US PELVIS COMPLETE   Final Result      NORMAL TRANSABDOMINAL PELVIC SONOGRAM      No retained products of conception seen. .          XR CHEST STANDARD (2 VW)   Final Result   NO ACUTE CARDIOPULMONARY PROCESS          CT ABDOMEN PELVIS W IV CONTRAST Additional Contrast? None    (Results Pending)   CTA CHEST W CONTRAST    (Results Pending)       ED Course / Medical Decision Making     Medications   0.9 % sodium chloride bolus (0 mLs Intravenous Stopped 6/14/19 1758)   acetaminophen (TYLENOL) tablet 1,000 mg (1,000 mg Oral Given 6/14/19 1716)   diphenhydrAMINE (BENADRYL) injection 25 mg (25 mg Intravenous Given 6/14/19 1716)      1800 Patient is on her second liter of fluid and is still tachycardic, the rest of the patient's imaging is returned and is negative therefore after discussing with Dr. Juaquin Krabbe a CTA of the chest and a CT of the abdomen and pelvis will be added. Case was signed out to my attending due to shift change. Consult(s):   None    Procedure(s):   none    Medical Decision Making:        Counseling: The emergency provider has spoken with the patient and discussed todays results, in addition to providing specific details for the plan of care and counseling regarding the diagnosis and prognosis. Questions are answered at this time and they are agreeable with the plan. Assessment      1. Acute cystitis with hematuria    2. Leukocytosis, unspecified type      Plan   Admit to telemetry  Patient condition is stable    New Medications     New Prescriptions    No medications on file     Electronically signed by Prema Farfan PA-C   DD: 6/14/19  **This report was transcribed using voice recognition software. Every effort was made to ensure accuracy; however, inadvertent computerized transcription errors may be present.   END OF ED PROVIDER NOTE   ATTENDING PROVIDER ATTESTATION:     I have personally performed and/or participated in the history, exam, medical decision making, and procedures and agree with all pertinent clinical information unless otherwise noted. I have also reviewed and agree with the past medical, family and social history unless otherwise noted. 8:36 PM    CAT scan of the chest was negative. CAT scan of abdomen pelvis was positive for possible enteritis. At this time now patient is telling me that she has been having cough cold congestion since last Sunday. Patient has been developing fever ever since she has been coughing. Will discharge patient home to follow-up with the PCP. Will give patient antibiotic. Patient also had some mild UTI. Patient feels much much better after Tylenol p.o. and IV fluid.        The Pepdave, DO  06/14/19 203

## 2019-06-16 LAB
ORGANISM: ABNORMAL
URINE CULTURE, ROUTINE: ABNORMAL
URINE CULTURE, ROUTINE: ABNORMAL

## 2019-06-20 LAB
BLOOD CULTURE, ROUTINE: NORMAL
CULTURE, BLOOD 2: ABNORMAL
CULTURE, BLOOD 2: ABNORMAL
ORGANISM: ABNORMAL

## 2022-11-13 ENCOUNTER — APPOINTMENT (OUTPATIENT)
Dept: ULTRASOUND IMAGING | Age: 27
End: 2022-11-13
Payer: MEDICAID

## 2022-11-13 ENCOUNTER — APPOINTMENT (OUTPATIENT)
Dept: GENERAL RADIOLOGY | Age: 27
End: 2022-11-13
Payer: MEDICAID

## 2022-11-13 ENCOUNTER — HOSPITAL ENCOUNTER (EMERGENCY)
Age: 27
Discharge: HOME | End: 2022-11-13
Payer: MEDICAID

## 2022-11-13 VITALS
SYSTOLIC BLOOD PRESSURE: 117 MMHG | TEMPERATURE: 97.2 F | BODY MASS INDEX: 35.51 KG/M2 | RESPIRATION RATE: 14 BRPM | DIASTOLIC BLOOD PRESSURE: 86 MMHG | HEART RATE: 86 BPM | OXYGEN SATURATION: 100 % | HEIGHT: 62 IN | WEIGHT: 193 LBS

## 2022-11-13 DIAGNOSIS — R05.1 ACUTE COUGH: Primary | ICD-10-CM

## 2022-11-13 DIAGNOSIS — R31.9 HEMATURIA, UNSPECIFIED TYPE: ICD-10-CM

## 2022-11-13 DIAGNOSIS — R10.9 ABDOMINAL CRAMPING: ICD-10-CM

## 2022-11-13 LAB
ALBUMIN SERPL-MCNC: 4.6 G/DL (ref 3.5–5.2)
ALP BLD-CCNC: 62 U/L (ref 35–104)
ALT SERPL-CCNC: 12 U/L (ref 0–32)
ANION GAP SERPL CALCULATED.3IONS-SCNC: 10 MMOL/L (ref 7–16)
AST SERPL-CCNC: 17 U/L (ref 0–31)
BACTERIA: ABNORMAL /HPF
BASOPHILS ABSOLUTE: 0.07 E9/L (ref 0–0.2)
BASOPHILS RELATIVE PERCENT: 0.6 % (ref 0–2)
BILIRUB SERPL-MCNC: 0.2 MG/DL (ref 0–1.2)
BILIRUBIN URINE: NEGATIVE
BLOOD, URINE: ABNORMAL
BUN BLDV-MCNC: 12 MG/DL (ref 6–20)
CALCIUM SERPL-MCNC: 9.7 MG/DL (ref 8.6–10.2)
CHLORIDE BLD-SCNC: 100 MMOL/L (ref 98–107)
CLARITY: CLEAR
CO2: 26 MMOL/L (ref 22–29)
COLOR: YELLOW
CREAT SERPL-MCNC: 0.7 MG/DL (ref 0.5–1)
EOSINOPHILS ABSOLUTE: 0.11 E9/L (ref 0.05–0.5)
EOSINOPHILS RELATIVE PERCENT: 1 % (ref 0–6)
EPITHELIAL CELLS, UA: ABNORMAL /HPF
GFR SERPL CREATININE-BSD FRML MDRD: >60 ML/MIN/1.73
GLUCOSE BLD-MCNC: 85 MG/DL (ref 74–99)
GLUCOSE URINE: NEGATIVE MG/DL
HCG, URINE, POC: NEGATIVE
HCT VFR BLD CALC: 43 % (ref 34–48)
HEMOGLOBIN: 13 G/DL (ref 11.5–15.5)
IMMATURE GRANULOCYTES #: 0.05 E9/L
IMMATURE GRANULOCYTES %: 0.4 % (ref 0–5)
INFLUENZA A BY PCR: NOT DETECTED
INFLUENZA B BY PCR: NOT DETECTED
KETONES, URINE: NEGATIVE MG/DL
LEUKOCYTE ESTERASE, URINE: NEGATIVE
LYMPHOCYTES ABSOLUTE: 2.94 E9/L (ref 1.5–4)
LYMPHOCYTES RELATIVE PERCENT: 26 % (ref 20–42)
Lab: NORMAL
MCH RBC QN AUTO: 21.6 PG (ref 26–35)
MCHC RBC AUTO-ENTMCNC: 30.2 % (ref 32–34.5)
MCV RBC AUTO: 71.4 FL (ref 80–99.9)
MONOCYTES ABSOLUTE: 0.64 E9/L (ref 0.1–0.95)
MONOCYTES RELATIVE PERCENT: 5.7 % (ref 2–12)
NEGATIVE QC PASS/FAIL: NORMAL
NEUTROPHILS ABSOLUTE: 7.5 E9/L (ref 1.8–7.3)
NEUTROPHILS RELATIVE PERCENT: 66.3 % (ref 43–80)
NITRITE, URINE: NEGATIVE
PDW BLD-RTO: 15.9 FL (ref 11.5–15)
PH UA: 7 (ref 5–9)
PLATELET # BLD: 367 E9/L (ref 130–450)
PMV BLD AUTO: 9.9 FL (ref 7–12)
POSITIVE QC PASS/FAIL: NORMAL
POTASSIUM SERPL-SCNC: 4.1 MMOL/L (ref 3.5–5)
PROTEIN UA: NEGATIVE MG/DL
RBC # BLD: 6.02 E12/L (ref 3.5–5.5)
RBC UA: ABNORMAL /HPF (ref 0–2)
RSV BY PCR: NEGATIVE
SARS-COV-2, NAAT: NOT DETECTED
SODIUM BLD-SCNC: 136 MMOL/L (ref 132–146)
SPECIFIC GRAVITY UA: 1.02 (ref 1–1.03)
TOTAL PROTEIN: 7.5 G/DL (ref 6.4–8.3)
UROBILINOGEN, URINE: 0.2 E.U./DL
WBC # BLD: 11.3 E9/L (ref 4.5–11.5)
WBC UA: ABNORMAL /HPF (ref 0–5)

## 2022-11-13 PROCEDURE — 99284 EMERGENCY DEPT VISIT MOD MDM: CPT

## 2022-11-13 PROCEDURE — 81001 URINALYSIS AUTO W/SCOPE: CPT

## 2022-11-13 PROCEDURE — 76830 TRANSVAGINAL US NON-OB: CPT

## 2022-11-13 PROCEDURE — 71046 X-RAY EXAM CHEST 2 VIEWS: CPT

## 2022-11-13 PROCEDURE — 87807 RSV ASSAY W/OPTIC: CPT

## 2022-11-13 PROCEDURE — 87502 INFLUENZA DNA AMP PROBE: CPT

## 2022-11-13 PROCEDURE — 80053 COMPREHEN METABOLIC PANEL: CPT

## 2022-11-13 PROCEDURE — 87635 SARS-COV-2 COVID-19 AMP PRB: CPT

## 2022-11-13 PROCEDURE — 85025 COMPLETE CBC W/AUTO DIFF WBC: CPT

## 2022-11-13 RX ORDER — BENZONATATE 100 MG/1
100 CAPSULE ORAL 3 TIMES DAILY PRN
Qty: 12 CAPSULE | Refills: 0 | Status: SHIPPED | OUTPATIENT
Start: 2022-11-13 | End: 2022-11-17

## 2022-11-13 NOTE — ED PROVIDER NOTES
[facility]  Department of Emergency Medicine   ED  Encounter Note  Admit Date/RoomTime: 2022 11:36 AM  ED Room:     NAME: Ivana Ignacio  : 1995  MRN: [id number]     Chief Complaint:  Urinary Frequency (Since yesterday), Cough, and Abdominal Cramping (Left lower)    History of Present Illness        Ivana Ignacio is a 32 y.o. old female who presents to the emergency department by private vehicle, with gradual onset non-productive cough which began a few day(s) prior to arrival.  The symptoms are associated with urinary frequency, chills, and left lower abdominal cramping and there has been no decreased appetite, chest tightness, conjunctivitis, watery eyes, productive cough, nausea, vomiting, diarrhea, dizziness, dysuria, earache, ear pulling, fatigue, headache, hoarseness, irritability, joint swelling, malaise, muscle aches, nasal congestion, neck stiffness, rash, sore throat, scratchy throat, swollen glands, wheezing, loss of taste, loss of smell, or shortness of breath or chest pain. She has prior history of no prior history of pneumonia or bronchitis in the past.  Since onset the symptoms have been intermittent and mild in severity. The symptoms are aggravated by nothing in particular and relieved by nothing in particular. ROS   Pertinent positives and negatives are stated within HPI, all other systems reviewed and are negative. Past Medical History:  has a past medical history of Anemia, Asthma, Mental disorder, and Postpartum depression. Surgical History:  has a past surgical history that includes Cholecystectomy (2017) and ERCP (2017). Social History:  reports that she has been smoking cigarettes. She has been smoking an average of .25 packs per day. She has never used smokeless tobacco. She reports current drug use. Drug: Marijuana Morris Fogo). She reports that she does not drink alcohol.     Family History: family history includes Kidney Disease in her father. Allergies: Motrin [ibuprofen] and Tylenol [acetaminophen]    Physical Exam   Oxygen Saturation Interpretation: Normal on room air analysis. ED Triage Vitals [11/13/22 1135]   BP Temp Temp Source Heart Rate Resp SpO2 Height Weight   117/86 97.2 °F (36.2 °C) Temporal 86 14 100 % 5' 2\" (1.575 m) 193 lb (87.5 kg)         Constitutional:  Alert, development consistent with age. HEENT:  NC/NT. Airway patent. normal TM's and external ear canals bilaterally. Neck:  Normal ROM. Supple. Respiratory:  Breath sounds: equal bilaterally. Lung sounds: normal.   CV:  Regular rate and rhythm, normal heart sounds, without pathological murmurs, ectopy, gallops, or rubs. .  GI:  Abdomen Soft, nontender, good bowel sounds. No firm or pulsatile mass. Integument:  Normal turgor. Warm, dry, without visible rash. Lymphatic:  Edema:  none Bilateral lower extremity(s). Neurological:  Oriented. Motor functions intact.     Lab / Imaging Results   (All laboratory and radiology results have been personally reviewed by myself)  Labs:  Results for orders placed or performed during the hospital encounter of 11/13/22   COVID-19, Rapid    Specimen: Nasopharyngeal Swab   Result Value Ref Range    SARS-CoV-2, NAAT Not Detected Not Detected   Rapid RSV Antigen    Specimen: Nasopharyngeal Swab   Result Value Ref Range    RSV by PCR Negative Negative   RAPID INFLUENZA A/B ANTIGENS    Specimen: Nasopharyngeal   Result Value Ref Range    Influenza A by PCR Not Detected Not Detected    Influenza B by PCR Not Detected Not Detected   Urinalysis   Result Value Ref Range    Color, UA Yellow Straw/Yellow    Clarity, UA Clear Clear    Glucose, Ur Negative Negative mg/dL    Bilirubin Urine Negative Negative    Ketones, Urine Negative Negative mg/dL    Specific Gravity, UA 1.020 1.005 - 1.030    Blood, Urine MODERATE (A) Negative    pH, UA 7.0 5.0 - 9.0    Protein, UA Negative Negative mg/dL Urobilinogen, Urine 0.2 <2.0 E.U./dL    Nitrite, Urine Negative Negative    Leukocyte Esterase, Urine Negative Negative   CBC with Auto Differential   Result Value Ref Range    WBC 11.3 4.5 - 11.5 E9/L    RBC 6.02 (H) 3.50 - 5.50 E12/L    Hemoglobin 13.0 11.5 - 15.5 g/dL    Hematocrit 43.0 34.0 - 48.0 %    MCV 71.4 (L) 80.0 - 99.9 fL    MCH 21.6 (L) 26.0 - 35.0 pg    MCHC 30.2 (L) 32.0 - 34.5 %    RDW 15.9 (H) 11.5 - 15.0 fL    Platelets 976 684 - 239 E9/L    MPV 9.9 7.0 - 12.0 fL    Neutrophils % 66.3 43.0 - 80.0 %    Immature Granulocytes % 0.4 0.0 - 5.0 %    Lymphocytes % 26.0 20.0 - 42.0 %    Monocytes % 5.7 2.0 - 12.0 %    Eosinophils % 1.0 0.0 - 6.0 %    Basophils % 0.6 0.0 - 2.0 %    Neutrophils Absolute 7.50 (H) 1.80 - 7.30 E9/L    Immature Granulocytes # 0.05 E9/L    Lymphocytes Absolute 2.94 1.50 - 4.00 E9/L    Monocytes Absolute 0.64 0.10 - 0.95 E9/L    Eosinophils Absolute 0.11 0.05 - 0.50 E9/L    Basophils Absolute 0.07 0.00 - 0.20 E9/L   CMP   Result Value Ref Range    Sodium 136 132 - 146 mmol/L    Potassium 4.1 3.5 - 5.0 mmol/L    Chloride 100 98 - 107 mmol/L    CO2 26 22 - 29 mmol/L    Anion Gap 10 7 - 16 mmol/L    Glucose 85 74 - 99 mg/dL    BUN 12 6 - 20 mg/dL    Creatinine 0.7 0.5 - 1.0 mg/dL    Est, Glom Filt Rate >60 >=60 mL/min/1.73    Calcium 9.7 8.6 - 10.2 mg/dL    Total Protein 7.5 6.4 - 8.3 g/dL    Albumin 4.6 3.5 - 5.2 g/dL    Total Bilirubin 0.2 0.0 - 1.2 mg/dL    Alkaline Phosphatase 62 35 - 104 U/L    ALT 12 0 - 32 U/L    AST 17 0 - 31 U/L   Microscopic Urinalysis   Result Value Ref Range    WBC, UA 1-3 0 - 5 /HPF    RBC, UA 5-10 (A) 0 - 2 /HPF    Epithelial Cells, UA RARE /HPF    Bacteria, UA FEW (A) None Seen /HPF   POC Pregnancy Urine Qual   Result Value Ref Range    HCG, Urine, POC Negative Negative    Lot Number [id number]     Positive QC Pass/Fail Acceptable     Negative QC Pass/Fail Acceptable      Imaging:   All Radiology results interpreted by Radiologist unless otherwise noted.  XR CHEST (2 VW)   Final Result   No acute process. US NON OB TRANSVAGINAL   Final Result   Unremarkable pelvic ultrasound. 1409-reviewed all results with patient. She has no complaints at this time. After shared decision-making she will be discharged home with Karthik Gomez discussed appropriate use and potential side effects of starting this medication. She states verbal understanding. She declines any abdominal pain. ED Course / Medical Decision Making   Medications - No data to display     Consult(s):   None    Procedure(s):   None    Medical Decision Making:   Patient presents to the ED for urinary frequency cough and left lower abdominal cramping. Differential diagnoses included but not limited to pregnancy versus UTI versus pneumonia versus COVID-19 versus influenza versus RSV. Workup in the ED revealed urinalysis was negative for nitrates and leukocytes. With moderate blood which was discussed with patient. CBC revealed a normal white count and hemoglobin no signs symptoms of bacterial infection. CMP was unremarkable. Urine pregnancy was negative. RSV, influenza AMB and COVID-19 were all negative. Chest x-ray was unremarkable. Ultrasound of the transvaginal region was unremarkable. Patient had no significant pain upon light and deep palpitation of the abdomen. Vital signs are stable. Patient continues to be non-toxic on re-evaluation. Findings were discussed with the patient and reasons to immediately return to the ED were articulated to them. They will follow-up with their PMD.      Assessment      1. Acute cough    2. Hematuria, unspecified type    3. Abdominal cramping      Plan   Discharged home. Patient condition is stable    New Medications     Discharge Medication List as of 11/13/2022  2:09 PM        Electronically signed by DINA Hairston CNP   DD: 11/13/22  **This report was transcribed using voice recognition software.  Every effort was made to ensure accuracy; however, inadvertent computerized transcription errors may be present.   END OF ED PROVIDER NOTE       Geralyn Jeans, APRN - CNP  11/13/22 3211

## 2022-11-13 NOTE — Clinical Note
Saurav Mirza was seen and treated in our emergency department on 11/13/2022. She may return to work on 11/15/2022. If you have any questions or concerns, please don't hesitate to call.       Tavia Lassiter, APRN - CNP

## 2022-11-15 ENCOUNTER — HOSPITAL ENCOUNTER (EMERGENCY)
Age: 27
Discharge: LEFT BEFORE DISCHARGE | End: 2022-11-15

## 2022-11-19 ENCOUNTER — HOSPITAL ENCOUNTER (EMERGENCY)
Age: 27
Discharge: HOME | End: 2022-11-20
Payer: MEDICAID

## 2022-11-19 ENCOUNTER — APPOINTMENT (OUTPATIENT)
Dept: ULTRASOUND IMAGING | Age: 27
End: 2022-11-19
Payer: MEDICAID

## 2022-11-19 DIAGNOSIS — N73.0 PID (ACUTE PELVIC INFLAMMATORY DISEASE): Primary | ICD-10-CM

## 2022-11-19 LAB
ALBUMIN SERPL-MCNC: 4.7 G/DL (ref 3.5–5.2)
ALP BLD-CCNC: 58 U/L (ref 35–104)
ALT SERPL-CCNC: 13 U/L (ref 0–32)
ANION GAP SERPL CALCULATED.3IONS-SCNC: 12 MMOL/L (ref 7–16)
AST SERPL-CCNC: 16 U/L (ref 0–31)
BACTERIA: ABNORMAL /HPF
BASOPHILS ABSOLUTE: 0.07 E9/L (ref 0–0.2)
BASOPHILS RELATIVE PERCENT: 0.4 % (ref 0–2)
BILIRUB SERPL-MCNC: 0.4 MG/DL (ref 0–1.2)
BILIRUBIN URINE: NEGATIVE
BLOOD, URINE: ABNORMAL
BUN BLDV-MCNC: 10 MG/DL (ref 6–20)
CALCIUM SERPL-MCNC: 9.7 MG/DL (ref 8.6–10.2)
CHLORIDE BLD-SCNC: 100 MMOL/L (ref 98–107)
CLARITY: CLEAR
CO2: 24 MMOL/L (ref 22–29)
COLOR: YELLOW
CREAT SERPL-MCNC: 0.7 MG/DL (ref 0.5–1)
EOSINOPHILS ABSOLUTE: 0.06 E9/L (ref 0.05–0.5)
EOSINOPHILS RELATIVE PERCENT: 0.4 % (ref 0–6)
EPITHELIAL CELLS, UA: ABNORMAL /HPF
GFR SERPL CREATININE-BSD FRML MDRD: >60 ML/MIN/1.73
GLUCOSE BLD-MCNC: 91 MG/DL (ref 74–99)
GLUCOSE URINE: NEGATIVE MG/DL
HCG, URINE, POC: NEGATIVE
HCT VFR BLD CALC: 43.5 % (ref 34–48)
HEMOGLOBIN: 13.8 G/DL (ref 11.5–15.5)
IMMATURE GRANULOCYTES #: 0.09 E9/L
IMMATURE GRANULOCYTES %: 0.5 % (ref 0–5)
KETONES, URINE: NEGATIVE MG/DL
LACTIC ACID: 0.9 MMOL/L (ref 0.5–2.2)
LEUKOCYTE ESTERASE, URINE: ABNORMAL
LYMPHOCYTES ABSOLUTE: 4.35 E9/L (ref 1.5–4)
LYMPHOCYTES RELATIVE PERCENT: 25.7 % (ref 20–42)
Lab: NORMAL
MAGNESIUM: 1.9 MG/DL (ref 1.6–2.6)
MCH RBC QN AUTO: 22.5 PG (ref 26–35)
MCHC RBC AUTO-ENTMCNC: 31.7 % (ref 32–34.5)
MCV RBC AUTO: 70.8 FL (ref 80–99.9)
MONOCYTES ABSOLUTE: 0.88 E9/L (ref 0.1–0.95)
MONOCYTES RELATIVE PERCENT: 5.2 % (ref 2–12)
NEGATIVE QC PASS/FAIL: NORMAL
NEUTROPHILS ABSOLUTE: 11.45 E9/L (ref 1.8–7.3)
NEUTROPHILS RELATIVE PERCENT: 67.8 % (ref 43–80)
NITRITE, URINE: NEGATIVE
PDW BLD-RTO: 15.8 FL (ref 11.5–15)
PH UA: 6.5 (ref 5–9)
PLATELET # BLD: 373 E9/L (ref 130–450)
PMV BLD AUTO: 9.9 FL (ref 7–12)
POSITIVE QC PASS/FAIL: NORMAL
POTASSIUM REFLEX MAGNESIUM: 3.3 MMOL/L (ref 3.5–5)
PROTEIN UA: NEGATIVE MG/DL
RBC # BLD: 6.14 E12/L (ref 3.5–5.5)
RBC UA: ABNORMAL /HPF (ref 0–2)
SODIUM BLD-SCNC: 136 MMOL/L (ref 132–146)
SPECIFIC GRAVITY UA: 1.01 (ref 1–1.03)
TOTAL PROTEIN: 7.8 G/DL (ref 6.4–8.3)
UROBILINOGEN, URINE: 0.2 E.U./DL
WBC # BLD: 16.9 E9/L (ref 4.5–11.5)
WBC UA: ABNORMAL /HPF (ref 0–5)

## 2022-11-19 PROCEDURE — 96374 THER/PROPH/DIAG INJ IV PUSH: CPT

## 2022-11-19 PROCEDURE — 76830 TRANSVAGINAL US NON-OB: CPT

## 2022-11-19 PROCEDURE — 6370000000 HC RX 637 (ALT 250 FOR IP)

## 2022-11-19 PROCEDURE — 87210 SMEAR WET MOUNT SALINE/INK: CPT

## 2022-11-19 PROCEDURE — 96375 TX/PRO/DX INJ NEW DRUG ADDON: CPT

## 2022-11-19 PROCEDURE — 6360000002 HC RX W HCPCS

## 2022-11-19 PROCEDURE — 2580000003 HC RX 258

## 2022-11-19 PROCEDURE — 85025 COMPLETE CBC W/AUTO DIFF WBC: CPT

## 2022-11-19 PROCEDURE — 80053 COMPREHEN METABOLIC PANEL: CPT

## 2022-11-19 PROCEDURE — 81001 URINALYSIS AUTO W/SCOPE: CPT

## 2022-11-19 PROCEDURE — 83735 ASSAY OF MAGNESIUM: CPT

## 2022-11-19 PROCEDURE — 99284 EMERGENCY DEPT VISIT MOD MDM: CPT

## 2022-11-19 PROCEDURE — 96372 THER/PROPH/DIAG INJ SC/IM: CPT

## 2022-11-19 PROCEDURE — 83605 ASSAY OF LACTIC ACID: CPT

## 2022-11-19 PROCEDURE — 93975 VASCULAR STUDY: CPT

## 2022-11-19 RX ORDER — KETOROLAC TROMETHAMINE 30 MG/ML
15 INJECTION, SOLUTION INTRAMUSCULAR; INTRAVENOUS ONCE
Status: COMPLETED | OUTPATIENT
Start: 2022-11-19 | End: 2022-11-19

## 2022-11-19 RX ORDER — POTASSIUM CHLORIDE 1.5 G/1.77G
20 POWDER, FOR SOLUTION ORAL ONCE
Status: DISCONTINUED | OUTPATIENT
Start: 2022-11-19 | End: 2022-11-19 | Stop reason: CLARIF

## 2022-11-19 RX ORDER — 0.9 % SODIUM CHLORIDE 0.9 %
1000 INTRAVENOUS SOLUTION INTRAVENOUS ONCE
Status: COMPLETED | OUTPATIENT
Start: 2022-11-19 | End: 2022-11-19

## 2022-11-19 RX ADMIN — KETOROLAC TROMETHAMINE 15 MG: 30 INJECTION, SOLUTION INTRAMUSCULAR at 22:03

## 2022-11-19 RX ADMIN — SODIUM CHLORIDE 1000 ML: 9 INJECTION, SOLUTION INTRAVENOUS at 21:02

## 2022-11-19 RX ADMIN — POTASSIUM BICARBONATE 20 MEQ: 782 TABLET, EFFERVESCENT ORAL at 22:03

## 2022-11-19 ASSESSMENT — PAIN DESCRIPTION - FREQUENCY: FREQUENCY: CONTINUOUS

## 2022-11-19 ASSESSMENT — PAIN DESCRIPTION - DESCRIPTORS
DESCRIPTORS: DISCOMFORT;CRAMPING
DESCRIPTORS: ACHING

## 2022-11-19 ASSESSMENT — PAIN DESCRIPTION - ORIENTATION: ORIENTATION: LEFT

## 2022-11-19 ASSESSMENT — PAIN DESCRIPTION - PAIN TYPE: TYPE: ACUTE PAIN

## 2022-11-19 ASSESSMENT — PAIN SCALES - GENERAL
PAINLEVEL_OUTOF10: 6
PAINLEVEL_OUTOF10: 5

## 2022-11-19 ASSESSMENT — PAIN - FUNCTIONAL ASSESSMENT: PAIN_FUNCTIONAL_ASSESSMENT: 0-10

## 2022-11-19 ASSESSMENT — PAIN DESCRIPTION - LOCATION
LOCATION: PELVIS
LOCATION: OTHER (COMMENT)

## 2022-11-20 VITALS
HEART RATE: 78 BPM | HEIGHT: 62 IN | RESPIRATION RATE: 16 BRPM | DIASTOLIC BLOOD PRESSURE: 74 MMHG | BODY MASS INDEX: 35.88 KG/M2 | OXYGEN SATURATION: 99 % | WEIGHT: 195 LBS | SYSTOLIC BLOOD PRESSURE: 120 MMHG | TEMPERATURE: 97.8 F

## 2022-11-20 LAB
CLUE CELLS: NORMAL
SOURCE WET PREP: NORMAL
TRICHOMONAS PREP: NORMAL
YEAST WET PREP: NORMAL

## 2022-11-20 PROCEDURE — 2580000003 HC RX 258

## 2022-11-20 PROCEDURE — 2500000003 HC RX 250 WO HCPCS

## 2022-11-20 PROCEDURE — 6360000002 HC RX W HCPCS

## 2022-11-20 PROCEDURE — A4216 STERILE WATER/SALINE, 10 ML: HCPCS

## 2022-11-20 RX ADMIN — LIDOCAINE HYDROCHLORIDE 500 MG: 10 INJECTION, SOLUTION EPIDURAL; INFILTRATION; INTRACAUDAL; PERINEURAL at 01:24

## 2022-11-20 RX ADMIN — FAMOTIDINE 20 MG: 10 INJECTION INTRAVENOUS at 01:25

## 2022-11-20 NOTE — ED NOTES
Department of Emergency Medicine  FIRST PROVIDER TRIAGE NOTE             [facility]           11/19/22  7:09 PM EST    Date of Encounter: 11/19/22   MRN: [id number]      HPI: Kourtney Ceballos is a 32 y.o. female who presents to the ED for Pelvic Pain (Worse on left than right)   Complaints of pelvic pain that has been ongoing for he past moth, but has been worsening in the left side of the pelvis. Patient currently on doxycycline for chlamydia treatment. ROS: Negative for cp or sob. PE: Gen Appearance/Constitutional: alert  CV: regular rate  Pulm: CTA bilat     Initial Plan of Care: All treatment areas with department are currently occupied. Plan to order/Initiate the following while awaiting opening in ED: labs and imaging studies.   Initiate Treatment-Testing, Proceed toTreatment Area When Bed Available for ED Attending/KELSEA to Continue Care    Electronically signed by DINA Alvarenga CNP   DD: 11/19/22       DINA Alvarenga CNP  11/19/22 1072

## 2022-11-20 NOTE — DISCHARGE INSTRUCTIONS
Continue taking doxycycline as prescribed until you finish entire prescription. [facility]

## 2022-11-20 NOTE — ED PROVIDER NOTES
[facility]  Department of Emergency Medicine   ED  Encounter Note  Admit Date/RoomTime: 2022  8:44 PM  ED Room:     NAME: Renetta Johnson  : 1995  MRN: [id number]     Chief Complaint:  Pelvic Pain (Worse on left than right)    History of Present Illness       Renetta Johnson is a 32 y.o. old female who presents to the emergency department by private vehicle, for persistent cramping pain in the pelvis without radiation which began several week(s) prior to arrival.   There has been no similar episodes in the past.  Since onset the symptoms have been intermittent. The pain is associated with vaginal discharge. The pain is aggravated by pressure on area of discomfort and relieved by nothing. There has been NO back pain, chest pain, shortness of breath, fever, chills, nausea, vomiting, diarrhea, constipation, dark/black stools, or blood in stool. Last Menstrual  Cycle or OB history not available or N/A.  STD Hx: Chlamydia. .  Patient stated that she has been having ongoing intermittent pelvic pain for the past several weeks. She states she was seen at [facility] on Tuesday, and they did a vaginal exam and did a bunch of testing for STIs. She states that they gave her a dose of Flagyl due to having BV, and states they called her 3 days ago and told her she had chlamydia and they sent a prescription for doxycycline to the pharmacy. She states that her discharge has decreased since starting the antibiotics, but she continues to have pelvic pain. Denies fevers or chills. No other complaints or concerns at this time. .  ROS   Pertinent positives and negatives are stated within HPI, all other systems reviewed and are negative. Past Medical History:  has a past medical history of Anemia, Asthma, Mental disorder, and Postpartum depression.     Surgical History has a past surgical history that includes Cholecystectomy (2017) and ERCP (06/02/2017). Social History:  reports that she has been smoking cigarettes. She has been smoking an average of .25 packs per day. She has never used smokeless tobacco. She reports current drug use. Drug: Marijuana [place]). She reports that she does not drink alcohol. Family History: family history includes Kidney Disease in her father. Allergies: Motrin [ibuprofen] and Tylenol [acetaminophen]    Physical Exam   Oxygen Saturation Interpretation: Normal on room air analysis. ED Triage Vitals [11/19/22 1910]   BP Temp Temp Source Heart Rate Resp SpO2 Height Weight   129/89 98.6 °F (37 °C) Oral (!) 102 18 100 % 5' 2\" (1.575 m) 195 lb (88.5 kg)        Physical Exam  General Appearance/Constitutional:  Alert, development consistent with age. HEENT:  NC/NT. PERRLA. Airway patent. Neck:  Supple. No lymphadenopathy. Respiratory:  No retractions. Lungs Clear to auscultation and breath sounds equal.  CV:  Regular rate and rhythm. GI:  normal appearing, non-distended with no visible hernias. Bowel sounds: normal bowel sounds. Tenderness: There is mild tenderness present - located in the diffuse pelvis. , There is no rebound tenderness. , There is no guarding. , There is no distension. , There is no pulsatile mass. .        Liver: non-tender. Spleen:  non-tender. Back: CVA Tenderness: No CVA tenderness. : Pelvic Exam: (Same sex / third party chaperone present during examination). External Genitalia: General appearance; normal, Hair distribution; normal, Lesions absent. Urethral Meatus: Size normal, Location normal, Lesions absent, Prolapse absent. Vagina: small amount of white discharge noted. and Wet Prep/KOH no pathogens. Cervix: not indicated. Uterus:  normal, mild cervical motion tenderness. Adenexa: no tenderness bilaterally. Anus/Perineum:  normal..  Integument:  Normal turgor.   Warm, dry, without visible rash, unless noted elsewhere. Lymphatics: No edema, cap.refill <3sec. Neurological:  Orientation age-appropriate. Motor functions intact.     Lab / Imaging Results   (All laboratory and radiology results have been personally reviewed by myself)  Labs:  Results for orders placed or performed during the hospital encounter of 11/19/22   Wet prep, genital    Specimen: Vaginal   Result Value Ref Range    Trichomonas Prep None Seen     Yeast, Wet Prep None Seen     Clue Cells, Wet Prep None Seen     Source Wet Prep VAGINAL    CBC with Auto Differential   Result Value Ref Range    WBC 16.9 (H) 4.5 - 11.5 E9/L    RBC 6.14 (H) 3.50 - 5.50 E12/L    Hemoglobin 13.8 11.5 - 15.5 g/dL    Hematocrit 43.5 34.0 - 48.0 %    MCV 70.8 (L) 80.0 - 99.9 fL    MCH 22.5 (L) 26.0 - 35.0 pg    MCHC 31.7 (L) 32.0 - 34.5 %    RDW 15.8 (H) 11.5 - 15.0 fL    Platelets 649 967 - 380 E9/L    MPV 9.9 7.0 - 12.0 fL    Neutrophils % 67.8 43.0 - 80.0 %    Immature Granulocytes % 0.5 0.0 - 5.0 %    Lymphocytes % 25.7 20.0 - 42.0 %    Monocytes % 5.2 2.0 - 12.0 %    Eosinophils % 0.4 0.0 - 6.0 %    Basophils % 0.4 0.0 - 2.0 %    Neutrophils Absolute 11.45 (H) 1.80 - 7.30 E9/L    Immature Granulocytes # 0.09 E9/L    Lymphocytes Absolute 4.35 (H) 1.50 - 4.00 E9/L    Monocytes Absolute 0.88 0.10 - 0.95 E9/L    Eosinophils Absolute 0.06 0.05 - 0.50 E9/L    Basophils Absolute 0.07 0.00 - 0.20 E9/L   Comprehensive Metabolic Panel w/ Reflex to MG   Result Value Ref Range    Sodium 136 132 - 146 mmol/L    Potassium reflex Magnesium 3.3 (L) 3.5 - 5.0 mmol/L    Chloride 100 98 - 107 mmol/L    CO2 24 22 - 29 mmol/L    Anion Gap 12 7 - 16 mmol/L    Glucose 91 74 - 99 mg/dL    BUN 10 6 - 20 mg/dL    Creatinine 0.7 0.5 - 1.0 mg/dL    Est, Glom Filt Rate >60 >=60 mL/min/1.73    Calcium 9.7 8.6 - 10.2 mg/dL    Total Protein 7.8 6.4 - 8.3 g/dL    Albumin 4.7 3.5 - 5.2 g/dL    Total Bilirubin 0.4 0.0 - 1.2 mg/dL    Alkaline Phosphatase 58 35 - 104 U/L    ALT 13 0 - 32 U/L AST 16 0 - 31 U/L   Urinalysis with Microscopic   Result Value Ref Range    Color, UA Yellow Straw/Yellow    Clarity, UA Clear Clear    Glucose, Ur Negative Negative mg/dL    Bilirubin Urine Negative Negative    Ketones, Urine Negative Negative mg/dL    Specific Gravity, UA 1.015 1.005 - 1.030    Blood, Urine MODERATE (A) Negative    pH, UA 6.5 5.0 - 9.0    Protein, UA Negative Negative mg/dL    Urobilinogen, Urine 0.2 <2.0 E.U./dL    Nitrite, Urine Negative Negative    Leukocyte Esterase, Urine TRACE (A) Negative    WBC, UA 0-1 0 - 5 /HPF    RBC, UA 10-20 (A) 0 - 2 /HPF    Epithelial Cells, UA NONE SEEN /HPF    Bacteria, UA NONE SEEN None Seen /HPF   Lactic Acid   Result Value Ref Range    Lactic Acid 0.9 0.5 - 2.2 mmol/L   Magnesium   Result Value Ref Range    Magnesium 1.9 1.6 - 2.6 mg/dL   POC Pregnancy Urine Qual   Result Value Ref Range    HCG, Urine, POC Negative Negative    Lot Number [id number]     Positive QC Pass/Fail Pass     Negative QC Pass/Fail Pass      Imaging: All Radiology results interpreted by Radiologist unless otherwise noted. US DUP ABD PEL RETRO SCROT COMPLETE   Final Result   1. Normal appearance of the uterus and endometrium. Trace simple appearing   free fluid in the cul-de-sac. 2.  Normal appearance of the bilateral ovaries. There are no adnexal masses. Normal ovarian vascularity         US NON OB TRANSVAGINAL   Final Result   1. Normal appearance of the uterus and endometrium. Trace simple appearing   free fluid in the cul-de-sac. 2.  Normal appearance of the bilateral ovaries. There are no adnexal masses.    Normal ovarian vascularity           ED Course / Medical Decision Making     Medications   0.9 % sodium chloride bolus (0 mLs IntraVENous Stopped 11/19/22 2206)   ketorolac (TORADOL) injection 15 mg (15 mg IntraVENous Given 11/19/22 2203)   potassium bicarb-citric acid (EFFER-K) effervescent tablet 20 mEq (20 mEq Oral Given 11/19/22 2203)   famotidine (PEPCID) 20 mg in sodium chloride (PF) 0.9 % 10 mL injection (20 mg IntraVENous Given 11/20/22 0125)   cefTRIAXone (ROCEPHIN) 500 mg in lidocaine 1 % 1.43 mL IM Injection (500 mg IntraMUSCular Given 11/20/22 0124)          Re-Evaluations:  11/19/22      Time: 0110    Patients condition has improved and is currently asymptomatic. Patient resting comfortably and easily aroused. Consultations:             None    Procedures:   none    MDM: Patient is a 43-year-old female who presents to the emergency department for pelvic pain which has been ongoing for the past several weeks. Patient is afebrile, and hemodynamically stable. Patient was treated for bacterial vaginosis 4 days ago. She states that 2 days ago she received her STI testing results, and they told her she was positive for chlamydia. Patient states that she picked up her prescription for doxycycline 2 days ago, and has now taken 3 doses. She states that she was continued to have some pelvic pain, and wanted to make sure everything was okay. She states that she has had significant provement of vaginal discharge since being on the antibiotics. Upon vaginal exam patient had a small amount of white discharge noted. A wet prep was sent which showed no pathogens. Patient did have a leukocytosis at 16.9. No evidence of anemia. Patient's potassium was decreased at 3.3, and was supplemented in the emergency department. Upon vaginal exam patient did have a mild amount of cervical motion tenderness. Ultrasound was done which showed uterus demonstrates normal myometrial echotexture. Right ovary is within normal limits. There is normal arterial and venous Doppler flow. No right adnexal masses. Left ovary is within normal limits. There is normal arterial and venous Doppler flow. No left adnexal mass. Trace simple appearing free fluid in the cul-de-sac. The free fluid is most likely due to patient just finishing her menstrual cycle.   Urinalysis just showed a moderate amount of blood. No evidence of UTI. Due to cervical motion tenderness, and patient's recent diagnosis of chlamydia. She will be given a shot of Rocephin while in the emergency department, and advised to continue her taking her doxycycline as prescribed. She will be treated for PID. Patient is well-appearing, nontoxic and in no acute distress. After receiving medication in the emergency department patient has had complete resolution of her pelvic pain. Plan is for discharge, with close follow-up with OB/GYN. Patient is agreeable to this plan. She is advised to return to the emergency department for any new or worsening of symptoms. At this time the patient is without objective evidence of an acute process requiring hospitalization or inpatient management. They have remained hemodynamically stable throughout their entire ED visit and are stable for discharge with outpatient follow-up. The plan has been discussed in detail and they are aware of the specific conditions for emergent return, as well as the importance of follow-up. Plan of Care/Counseling:  DINA Nevarez CNP reviewed today's visit with the patient in addition to providing specific details for the plan of care and counseling regarding the diagnosis and prognosis. Questions are answered at this time and are agreeable with the plan. Assessment      1. PID (acute pelvic inflammatory disease)      This patient's ED course included: a personal history and physicial examination, multiple bedside re-evaluations, and IV medications  This patient has remained hemodynamically stable during their ED course. Plan   Discharged home  Patient condition is good. New Medications     Discharge Medication List as of 11/20/2022  1:01 AM        Electronically signed by DINA Nevarez CNP   DD: 11/19/22  **This report was transcribed using voice recognition software.  Every effort was made to ensure accuracy; however, inadvertent computerized transcription errors may be present.   END OF PROVIDER NOTE       Omar Martinez, DINA - CNP  11/20/22 5594

## 2022-11-20 NOTE — ED PROVIDER NOTES
{ED [place]:82980}  Department of Emergency Medicine   ED  Encounter Note  Admit Date/RoomTime: 2022  8:44 PM  ED Room:     NAME: Sammi Thompson  : 1995  MRN: [id number]     Chief Complaint:  Pelvic Pain (Worse on left than right)    History of Present Illness       Sammi Thompson is a 32 y.o. old female who presents to the emergency department {arrived by:42968}, for {:26996} {:91195:x} pain {:86299} {Symptoms; abdominal pain radiation female:59385} which began {:05721} {TIME FRAME:} prior to arrival.   There {Actions; has been/no:89374} similar episodes in the past ***. Since onset the symptoms have been {:12372}. The pain is associated with {:19699}. The pain is aggravated by {:07102} and relieved by {:620::nothing}. There has been NO {:55560}. {LMP/OB Hx:19452}  {GYN Hx:27544}. {To Insert CAD, AAA or Ischemic Bowel Risk Factor Tables, Choose *,*,*  From Menu and type <dot>errisk and choose from list, otherwise press Enter to continue. :[id number]::.}  ROS   Pertinent positives and negatives are stated within HPI, all other systems reviewed and are negative. Past Medical History:  has a past medical history of Anemia, Asthma, Mental disorder, and Postpartum depression. Surgical History has a past surgical history that includes Cholecystectomy (2017) and ERCP (2017). Social History:  reports that she has been smoking cigarettes. She has been smoking an average of .25 packs per day. She has never used smokeless tobacco. She reports current drug use. Drug: Marijuana [place]). She reports that she does not drink alcohol. Family History: family history includes Kidney Disease in her father. Allergies: Motrin [ibuprofen] and Tylenol [acetaminophen]    Physical Exam   Oxygen Saturation Interpretation: {Pulse ox analysis:77170}.         ED Triage Vitals [22 1910]   BP Temp Temp Source Heart Rate Resp SpO2 Height Weight   129/89 98.6 °F (37 °C) Oral (!) 102 18 100 % 5' 2\" (1.575 m) 195 lb (88.5 kg)        Physical Exam  General Appearance/Constitutional:  Alert, development consistent with age. HEENT:  NC/NT. PERRLA. Airway patent. Neck:  Supple. No lymphadenopathy. Respiratory:  No retractions. Lungs Clear to auscultation and breath sounds equal.  CV:  Regular rate and rhythm. GI:  {:26940}. Bowel sounds: {:72907}. Tenderness: {:99899}. Liver: {:19335}. Spleen:  {:12638}. Back: CVA Tenderness: {:93298}. : {Pelvic/Rectal Exam:89118}. Integument:  Normal turgor. Warm, dry, without visible rash, unless noted elsewhere. Lymphatics: No edema, cap.refill <3sec. Neurological:  Orientation age-appropriate. Motor functions intact. Lab / Imaging Results   (All laboratory and radiology results have been personally reviewed by myself)  Labs:  No results found for this visit on 11/19/22. Imaging: All Radiology results interpreted by Radiologist unless otherwise noted. US DUP ABD PEL RETRO SCROT COMPLETE   Final Result   1. Normal appearance of the uterus and endometrium. Trace simple appearing   free fluid in the cul-de-sac. 2.  Normal appearance of the bilateral ovaries. There are no adnexal masses. Normal ovarian vascularity         US NON OB TRANSVAGINAL   Final Result   1. Normal appearance of the uterus and endometrium. Trace simple appearing   free fluid in the cul-de-sac. 2.  Normal appearance of the bilateral ovaries. There are no adnexal masses. Normal ovarian vascularity           ED Course / Medical Decision Making     Medications   0.9 % sodium chloride bolus (has no administration in time range)   ketorolac (TORADOL) injection 15 mg (has no administration in time range)        {Optional Item ZNM:11950}  Re-Evaluations:  11/19/22      Time: ***    Patients condition {:65651}.     Consultations:             None    Procedures:   {MISC; NONE (CAPS):71863::none}    MDM: ***    Plan of Care/Counseling:  {:51930:} reviewed today's visit with the {Persons; Family Members:55035} in addition to providing specific details for the plan of care and counseling regarding the diagnosis and prognosis. Questions are answered at this time and are agreeable with the plan. Assessment    No diagnosis found. This patient's ED course included: {ED Events:20749::\"a personal history and physicial examination\"}  This patient has {ED Patient's Course:20751::remained hemodynamically stable} during their ED course. Plan   {Plan; disposition:88752}  Patient condition is {condition:82841}. New Medications     New Prescriptions    No medications on file     Electronically signed by DINA Harrison CNP   DD: 11/19/22  **This report was transcribed using voice recognition software. Every effort was made to ensure accuracy; however, inadvertent computerized transcription errors may be present.   END OF PROVIDER NOTE

## 2022-11-25 ENCOUNTER — HOSPITAL ENCOUNTER (EMERGENCY)
Age: 27
Discharge: HOME | End: 2022-11-25
Attending: EMERGENCY MEDICINE
Payer: MEDICAID

## 2022-11-25 VITALS
SYSTOLIC BLOOD PRESSURE: 116 MMHG | WEIGHT: 195 LBS | DIASTOLIC BLOOD PRESSURE: 67 MMHG | HEART RATE: 99 BPM | RESPIRATION RATE: 16 BRPM | HEIGHT: 61 IN | BODY MASS INDEX: 36.82 KG/M2 | TEMPERATURE: 98.3 F | OXYGEN SATURATION: 100 %

## 2022-11-25 DIAGNOSIS — H66.002 NON-RECURRENT ACUTE SUPPURATIVE OTITIS MEDIA OF LEFT EAR WITHOUT SPONTANEOUS RUPTURE OF TYMPANIC MEMBRANE: Primary | ICD-10-CM

## 2022-11-25 LAB
HCG, URINE, POC: NEGATIVE
Lab: NORMAL
NEGATIVE QC PASS/FAIL: NORMAL
POSITIVE QC PASS/FAIL: NORMAL

## 2022-11-25 PROCEDURE — 99283 EMERGENCY DEPT VISIT LOW MDM: CPT

## 2022-11-25 RX ORDER — AMOXICILLIN 500 MG/1
500 CAPSULE ORAL 3 TIMES DAILY
Qty: 21 CAPSULE | Refills: 0 | Status: SHIPPED | OUTPATIENT
Start: 2022-11-25 | End: 2022-12-02

## 2022-11-25 ASSESSMENT — LIFESTYLE VARIABLES
HOW MANY STANDARD DRINKS CONTAINING ALCOHOL DO YOU HAVE ON A TYPICAL DAY: PATIENT DOES NOT DRINK
HOW OFTEN DO YOU HAVE A DRINK CONTAINING ALCOHOL: NEVER

## 2022-11-25 ASSESSMENT — PAIN SCALES - GENERAL: PAINLEVEL_OUTOF10: 7

## 2022-11-25 ASSESSMENT — PAIN DESCRIPTION - LOCATION: LOCATION: EAR

## 2022-11-25 NOTE — ED PROVIDER NOTES
HPI:  11/25/22, Time: 7:28 AM DIVINE Calloway is a 32 y.o. female presenting to the ED for Left Ear fullness, beginning several days worsening this am.  ago. The complaint has been persistent, mild in severity, and worsened by movement of her head. No fever or chills no runny nose or sore throat. No close contacts ill. No chest pain or SOB. Review of Systems:   A complete review of systems was performed and pertinent positives and negatives are stated within HPI, all other systems reviewed and are negative.    --------------------------------------------- PAST HISTORY ---------------------------------------------  Past Medical History:  has a past medical history of Anemia, Asthma, Mental disorder, and Postpartum depression. Past Surgical History:  has a past surgical history that includes Cholecystectomy (06/01/2017) and ERCP (06/02/2017). Social History:  reports that she has been smoking cigarettes. She has been smoking an average of .25 packs per day. She has never used smokeless tobacco. She reports current drug use. Drug: Marijuana [place]). She reports that she does not drink alcohol. Family History: family history includes Kidney Disease in her father. The patients home medications have been reviewed.     Allergies: Motrin [ibuprofen] and Tylenol [acetaminophen]    -------------------------------------------------- RESULTS -------------------------------------------------  All laboratory and radiology results have been personally reviewed by myself   LABS:  Results for orders placed or performed during the hospital encounter of 11/25/22   POC Pregnancy Urine Qual   Result Value Ref Range    HCG, Urine, POC Negative Negative    Lot Number [id number]     Positive QC Pass/Fail Pass     Negative QC Pass/Fail Pass        RADIOLOGY:  Interpreted by Radiologist.  No orders to display       ------------------------- NURSING NOTES AND VITALS REVIEWED ---------------------------   The nursing notes within the ED encounter and vital signs as below have been reviewed. /67   Pulse 99   Temp 98.3 °F (36.8 °C) (Oral)   Resp 16   Ht 5' 1\" (1.549 m)   Wt 195 lb (88.5 kg)   LMP 10/19/2022 (Exact Date)   SpO2 100%   BMI 36.84 kg/m²   Oxygen Saturation Interpretation: Normal      ---------------------------------------------------PHYSICAL EXAM--------------------------------------      Constitutional/General: Alert and oriented x3, well appearing, non toxic in NAD  Head: Normocephalic and atraumatic  ENT: Right TM is intact there is no debris in the canal.  She has no auricular or tragus tenderness. Left TM is bulging red. There is no debris or discharge. She has some tenderness to the tragus. There is no posterior auricular adenopathy. Eyes: PERRL, EOMI  Mouth: Oropharynx clear, handling secretions, no trismus, no posterior pharyngeal erythema or exudate. Patient speaks clearly. Neck: Supple, full ROM, no anterior cervical or posterior cervical adenopathy. Meningeal signs  Pulmonary: Lungs clear to auscultation bilaterally, no wheezes, rales, or rhonchi. Not in respiratory distress  Cardiovascular:  Regular rate and rhythm, no murmurs, gallops, or rubs. 2+ distal pulses  Extremities: Moves all extremities x 4. Warm and well perfused  Skin: warm and dry without rash      ------------------------------ ED COURSE/MEDICAL DECISION MAKING----------------------  Medications - No data to display      ED COURSE:       Medical Decision Making:    Patient presents with left ear fullness for several days. She is concerned she may be pregnant. UA was sent for a point-of-care. She will be discharged on antibiotics and OTC gtts. for pain control. Counseling: The emergency provider has spoken with the patient and discussed todays results, in addition to providing specific details for the plan of care and counseling regarding the diagnosis and prognosis.   Questions are answered at this time and they are agreeable with the plan.      --------------------------------- IMPRESSION AND DISPOSITION ---------------------------------    IMPRESSION  1. Non-recurrent acute suppurative otitis media of left ear without spontaneous rupture of tympanic membrane        DISPOSITION  Disposition: Discharge to home  Patient condition is stable      NOTE: This report was transcribed using voice recognition software.  Every effort was made to ensure accuracy; however, inadvertent computerized transcription errors may be present       Andrew Reasons, DO  11/25/22 2741

## 2023-03-20 ENCOUNTER — ANCILLARY PROCEDURE (OUTPATIENT)
Dept: OBGYN CLINIC | Age: 28
End: 2023-03-20
Payer: MEDICAID

## 2023-03-20 ENCOUNTER — INITIAL PRENATAL (OUTPATIENT)
Dept: OBGYN CLINIC | Age: 28
End: 2023-03-20
Payer: MEDICAID

## 2023-03-20 VITALS
DIASTOLIC BLOOD PRESSURE: 56 MMHG | BODY MASS INDEX: 35.33 KG/M2 | WEIGHT: 187 LBS | SYSTOLIC BLOOD PRESSURE: 89 MMHG | HEART RATE: 107 BPM

## 2023-03-20 DIAGNOSIS — O99.340 ANXIETY DURING PREGNANCY: ICD-10-CM

## 2023-03-20 DIAGNOSIS — Z34.90 EIGHTH PREGNANCY: ICD-10-CM

## 2023-03-20 DIAGNOSIS — D64.9 ANEMIA, UNSPECIFIED TYPE: ICD-10-CM

## 2023-03-20 DIAGNOSIS — F32.A DEPRESSION AFFECTING PREGNANCY: ICD-10-CM

## 2023-03-20 DIAGNOSIS — Z64.1 GRAND MULTIPARA: ICD-10-CM

## 2023-03-20 DIAGNOSIS — O99.340 DEPRESSION AFFECTING PREGNANCY: ICD-10-CM

## 2023-03-20 DIAGNOSIS — Z3A.21 21 WEEKS GESTATION OF PREGNANCY: Primary | ICD-10-CM

## 2023-03-20 DIAGNOSIS — F41.9 ANXIETY DURING PREGNANCY: ICD-10-CM

## 2023-03-20 LAB
GLUCOSE URINE, POC: NEGATIVE
PROTEIN UA: NEGATIVE

## 2023-03-20 PROCEDURE — 99203 OFFICE O/P NEW LOW 30 MIN: CPT | Performed by: OBSTETRICS & GYNECOLOGY

## 2023-03-20 PROCEDURE — 99244 OFF/OP CNSLTJ NEW/EST MOD 40: CPT | Performed by: OBSTETRICS & GYNECOLOGY

## 2023-03-20 PROCEDURE — 76811 OB US DETAILED SNGL FETUS: CPT | Performed by: OBSTETRICS & GYNECOLOGY

## 2023-03-20 PROCEDURE — G8419 CALC BMI OUT NRM PARAM NOF/U: HCPCS | Performed by: OBSTETRICS & GYNECOLOGY

## 2023-03-20 PROCEDURE — G8427 DOCREV CUR MEDS BY ELIG CLIN: HCPCS | Performed by: OBSTETRICS & GYNECOLOGY

## 2023-03-20 PROCEDURE — 76817 TRANSVAGINAL US OBSTETRIC: CPT | Performed by: OBSTETRICS & GYNECOLOGY

## 2023-03-20 PROCEDURE — G8484 FLU IMMUNIZE NO ADMIN: HCPCS | Performed by: OBSTETRICS & GYNECOLOGY

## 2023-03-20 PROCEDURE — 81002 URINALYSIS NONAUTO W/O SCOPE: CPT | Performed by: OBSTETRICS & GYNECOLOGY

## 2023-03-20 NOTE — PROGRESS NOTES
Pt here for new patient ultrasound  Pt denies any bleeding but has some cramping/nausea/vomiting  Pt feeling fetal movement
is greater than 5-day difference between dating by her LMP and 7-week ultrasound, the recommendation was made to use her 7-week ultrasound for dating. Thus, her due date is 2023. Today's ultrasound agreed with dating by her 7-week ultrasound. 2.  History of bacterial vaginosis and trichomonas -- The patient reports that her Pap smear was positive for bacterial vaginosis and trichomonas. She reports that she was treated for the infection. She denies having any recurrent signs or symptoms of infection. She states that her partner was also treated. --A test of cure is recommended for the trichomonas. --Infection precautions were reviewed. 3.  Alpha thalassemia trait -- The patient's records were reviewed. She is noted to have alpha thalassemia trait, homozygous (a-/a-). Testing was done via [facility]. Reflex fetal testing was done on placental DNA. Per the report, the fetal risk for hemoglobin H disease is increased. Reported risk is 1 in 220. The reported fetal fraction was 11.6%. Carrier screening is recommended for the patient's partner. She reports that her partner is Lamont Mike,  89.  -- A handout was provided reviewing carrier screening with a [employer] 14 panel. Counseling was provided. The loss of 2 alpha-chain genes causes alpha thalassemia minor. The genotype can be heterozygous for the alpha thalassemia-1 trait (i.e. aa/--, more common in individuals with  ancestry), or homozyous for the alpha thalassemia-2 trait (i.e. a-/a-; more common in individuals of  ancestry). This condition is typically mild and characterized by mild anemia, hypochromia, and microcytosis without other clinically obvious manifestations. This condition may remain undiagnosed, or it may be detected incidentally on a routine CBC.     If the patient's partner is not a carrier for alpha thalassemia, then there is a 50% chance that her child/children will be a carrier for alpha

## 2023-03-20 NOTE — PATIENT INSTRUCTIONS
Please arrive for your scheduled appointment at least 15 minutes early with your actual insurance card+ a photo ID. Also if you need any refills ordered or have questions, it may take up 48 hours to reply. Please allow ample time for your refills. Call me when you use last refill. Thank you for your cooperation. Call your primary obstetrician with bleeding, leaking of fluid, abdominal tenderness, headache, blurry vision, epigastric pain and increased urinary frequency. if you are sick, not feeling well or have an infectious process going on please reschedule your appointment by calling [phone]. Also if any family members are not feeling well, please do not bring them to your appointment. We appreciate your cooperation. We are doing this in order to protect our pregnant mothers+ their babies. if you are sick, not feeling well or have an infectious process going on please reschedule your appointment by calling [phone]. Also if any family members are not feeling well, please do not bring them to your appointment. We appreciate your cooperation. We are doing this in order to protect our pregnant mothers+ their babies.

## 2023-03-20 NOTE — LETTER
March 20, 2023       Jocelyn Herrera YOB: 1995   [address] [facility] [address] Date of Visit:  3/20/2023       To Whom It May Concern: It is my medical opinion that Karley Benjamin may work with the following restrictions: lifting/carrying not to exceed 10 lbs. , pushing/pulling not to exceed 10 lbs. She should avoid prolonged standing (>2 hours without a break). If you have any questions or concerns, please don't hesitate to call.     Sincerely,        Don Avery MD
anesthesia    HOSPITALIZATIONS:  Child birth, surgery     ALLERGIES:  Tylenol -- mouth itching, swelling  Ibuprofen -- mouth itching, swelling  She denies any latex or shellfish allergies    SOCIAL HISTORY:  She is smoking 1/2 PPD; She is trying to cut back. She was using THC smoking, but is cutting back. She denies any alcohol use. She is working at a [employer]. FAMILY MEDICAL HISTORY:   Negative for congenital abnormalities, autism, genetic disease and mental retardation, not listed above. Cancer None  CAD Mother -- CHTN  CVA Mother, related to HTN  Congenital anomalies None  DM Maternal side  DVT None  Bleeding disorders None  Autoimmune disorders None    Review of Systems :   CONSTITUTIONAL : No fever, no chills   HEENT : No headache, no visual changes, no rhinorrhea, no sore throat   CARDIOVASCULAR : No pain, no palpitations, no edema   RESPIRATORY : No pain, no shortness of breath   GASTROINTESTINAL : No N/V, no D/C, no abdominal pain   GENITOURINARY : No dysuria, hematuria and no incontinence   MUSCULOSKELETAL : No myalgia, No back pain  NEUROLOGICAL : No numbness, no tingling, no tremors. No history of seizures  ALL OTHER SYSTEMS WERE REPORTED AS NEGATIVE. PERTINENT PHYSICAL EXAMINATION:   BP (!) 89/56   Pulse (!) 107   Wt 187 lb (84.8 kg)   LMP 10/19/2022 (Exact Date)   BMI 35.33 kg/m²     Urine dipstick:   Negative for Glucose    Negative for Albumin      GENERAL:   The patient is a well developed, female who is alert cooperative and oriented times three in no acute distress. HEENT:  Normo cephalic and atraumatic. No facial edema. ABDOMEN:   Her uterus is gravid. She had no complaint of abdominal pain or tenderness. EXTREMITIES:  No peripheral edema is noted. PELVIC EXAMINATION:  Transvaginal ultrasound assessment of the cervix was performed. The cervical length was 57.4 mm, without funneling of the amniotic membranes.      A fetal ultrasound assessment was

## 2023-04-03 PROBLEM — F32.A DEPRESSION AFFECTING PREGNANCY: Status: ACTIVE | Noted: 2023-04-03

## 2023-04-03 PROBLEM — D64.9 ANEMIA: Status: ACTIVE | Noted: 2023-04-03

## 2023-04-03 PROBLEM — O99.340 ANXIETY DURING PREGNANCY: Status: ACTIVE | Noted: 2023-04-03

## 2023-04-03 PROBLEM — F41.9 ANXIETY DURING PREGNANCY: Status: ACTIVE | Noted: 2023-04-03

## 2023-06-14 ENCOUNTER — HOSPITAL ENCOUNTER (OUTPATIENT)
Age: 28
Setting detail: OBSERVATION
Discharge: HOME | End: 2023-06-15
Attending: OBSTETRICS & GYNECOLOGY | Admitting: OBSTETRICS & GYNECOLOGY
Payer: MEDICAID

## 2023-06-14 PROBLEM — Z3A.33 33 WEEKS GESTATION OF PREGNANCY: Status: ACTIVE | Noted: 2023-06-14

## 2023-06-14 LAB
BACTERIA URNS QL MICRO: ABNORMAL /HPF
BILIRUB UR QL STRIP: NEGATIVE
CLARITY UR: CLEAR
COLOR UR: YELLOW
EPI CELLS #/AREA URNS HPF: ABNORMAL /HPF
GLUCOSE UR STRIP-MCNC: NEGATIVE MG/DL
HGB UR QL STRIP: ABNORMAL
KETONES UR STRIP-MCNC: NEGATIVE MG/DL
LEUKOCYTE ESTERASE UR QL STRIP: NEGATIVE
NITRITE UR QL STRIP: NEGATIVE
PH UR STRIP: 7 [PH] (ref 5–9)
PROT UR STRIP-MCNC: NEGATIVE MG/DL
RBC #/AREA URNS HPF: ABNORMAL /HPF (ref 0–2)
SP GR UR STRIP: 1.01 (ref 1–1.03)
UROBILINOGEN UR STRIP-ACNC: 2 E.U./DL
WBC #/AREA URNS HPF: ABNORMAL /HPF (ref 0–5)

## 2023-06-14 PROCEDURE — 6360000002 HC RX W HCPCS: Performed by: OBSTETRICS & GYNECOLOGY

## 2023-06-14 PROCEDURE — 96372 THER/PROPH/DIAG INJ SC/IM: CPT

## 2023-06-14 PROCEDURE — 81001 URINALYSIS AUTO W/SCOPE: CPT

## 2023-06-14 PROCEDURE — 2580000003 HC RX 258: Performed by: OBSTETRICS & GYNECOLOGY

## 2023-06-14 PROCEDURE — G0378 HOSPITAL OBSERVATION PER HR: HCPCS

## 2023-06-14 RX ORDER — SODIUM CHLORIDE, SODIUM LACTATE, POTASSIUM CHLORIDE, AND CALCIUM CHLORIDE .6; .31; .03; .02 G/100ML; G/100ML; G/100ML; G/100ML
500 INJECTION, SOLUTION INTRAVENOUS ONCE
Status: COMPLETED | OUTPATIENT
Start: 2023-06-14 | End: 2023-06-14

## 2023-06-14 RX ORDER — BETAMETHASONE SODIUM PHOSPHATE AND BETAMETHASONE ACETATE 3; 3 MG/ML; MG/ML
12 INJECTION, SUSPENSION INTRA-ARTICULAR; INTRALESIONAL; INTRAMUSCULAR; SOFT TISSUE EVERY 24 HOURS
Status: DISCONTINUED | OUTPATIENT
Start: 2023-06-14 | End: 2023-06-15 | Stop reason: HOSPADM

## 2023-06-14 RX ORDER — SODIUM CHLORIDE, SODIUM LACTATE, POTASSIUM CHLORIDE, CALCIUM CHLORIDE 600; 310; 30; 20 MG/100ML; MG/100ML; MG/100ML; MG/100ML
INJECTION, SOLUTION INTRAVENOUS CONTINUOUS
Status: DISCONTINUED | OUTPATIENT
Start: 2023-06-14 | End: 2023-06-15 | Stop reason: HOSPADM

## 2023-06-14 RX ORDER — TERBUTALINE SULFATE 1 MG/ML
0.25 INJECTION, SOLUTION SUBCUTANEOUS ONCE
Status: COMPLETED | OUTPATIENT
Start: 2023-06-14 | End: 2023-06-14

## 2023-06-14 RX ADMIN — BETAMETHASONE SODIUM PHOSPHATE AND BETAMETHASONE ACETATE 12 MG: 3; 3 INJECTION, SUSPENSION INTRA-ARTICULAR; INTRALESIONAL; INTRAMUSCULAR at 23:34

## 2023-06-14 RX ADMIN — SODIUM CHLORIDE, POTASSIUM CHLORIDE, SODIUM LACTATE AND CALCIUM CHLORIDE 500 ML: 600; 310; 30; 20 INJECTION, SOLUTION INTRAVENOUS at 20:05

## 2023-06-14 RX ADMIN — TERBUTALINE SULFATE 0.25 MG: 1 INJECTION, SOLUTION SUBCUTANEOUS at 19:52

## 2023-06-14 RX ADMIN — SODIUM CHLORIDE, POTASSIUM CHLORIDE, SODIUM LACTATE AND CALCIUM CHLORIDE: 600; 310; 30; 20 INJECTION, SOLUTION INTRAVENOUS at 21:30

## 2023-06-14 ASSESSMENT — PAIN - FUNCTIONAL ASSESSMENT
PAIN_FUNCTIONAL_ASSESSMENT: ACTIVITIES ARE NOT PREVENTED
PAIN_FUNCTIONAL_ASSESSMENT: 0-10

## 2023-06-14 ASSESSMENT — PAIN DESCRIPTION - DESCRIPTORS: DESCRIPTORS: CRAMPING;DISCOMFORT

## 2023-06-14 NOTE — PROGRESS NOTES
Patient presents to l&d at 33.1 weeks gestation c/o constant abdominal pain since 0800. Denies lof and vb. +FM. EFM applied. States possible iugr, never followed up with mfm to confirm.

## 2023-06-15 ENCOUNTER — ANCILLARY PROCEDURE (OUTPATIENT)
Dept: OBGYN CLINIC | Age: 28
End: 2023-06-15
Payer: MEDICAID

## 2023-06-15 VITALS
TEMPERATURE: 98.1 F | HEART RATE: 90 BPM | SYSTOLIC BLOOD PRESSURE: 128 MMHG | DIASTOLIC BLOOD PRESSURE: 62 MMHG | RESPIRATION RATE: 16 BRPM

## 2023-06-15 PROCEDURE — 99214 OFFICE O/P EST MOD 30 MIN: CPT

## 2023-06-15 PROCEDURE — G0378 HOSPITAL OBSERVATION PER HR: HCPCS

## 2023-06-15 PROCEDURE — 76820 UMBILICAL ARTERY ECHO: CPT | Performed by: OBSTETRICS & GYNECOLOGY

## 2023-06-15 PROCEDURE — 76816 OB US FOLLOW-UP PER FETUS: CPT | Performed by: OBSTETRICS & GYNECOLOGY

## 2023-06-15 PROCEDURE — 96372 THER/PROPH/DIAG INJ SC/IM: CPT

## 2023-06-15 PROCEDURE — 99222 1ST HOSP IP/OBS MODERATE 55: CPT | Performed by: INTERNAL MEDICINE

## 2023-06-15 PROCEDURE — C8957 PROLONGED IV INF, REQ PUMP: HCPCS

## 2023-06-15 PROCEDURE — 76821 MIDDLE CEREBRAL ARTERY ECHO: CPT | Performed by: OBSTETRICS & GYNECOLOGY

## 2023-06-15 PROCEDURE — 76819 FETAL BIOPHYS PROFIL W/O NST: CPT | Performed by: OBSTETRICS & GYNECOLOGY

## 2023-06-15 PROCEDURE — 36415 COLL VENOUS BLD VENIPUNCTURE: CPT

## 2023-06-15 PROCEDURE — 6370000000 HC RX 637 (ALT 250 FOR IP)

## 2023-06-15 PROCEDURE — 6360000002 HC RX W HCPCS: Performed by: OBSTETRICS & GYNECOLOGY

## 2023-06-15 RX ORDER — ACETAMINOPHEN 325 MG/1
650 TABLET ORAL EVERY 6 HOURS PRN
Status: DISCONTINUED | OUTPATIENT
Start: 2023-06-15 | End: 2023-06-15 | Stop reason: HOSPADM

## 2023-06-15 RX ORDER — DIPHENHYDRAMINE HCL 25 MG
25 TABLET ORAL EVERY 6 HOURS PRN
Status: DISCONTINUED | OUTPATIENT
Start: 2023-06-15 | End: 2023-06-15 | Stop reason: HOSPADM

## 2023-06-15 RX ORDER — DIPHENHYDRAMINE HCL 25 MG
25 TABLET ORAL EVERY 6 HOURS PRN
Status: DISCONTINUED | OUTPATIENT
Start: 2023-06-15 | End: 2023-06-15

## 2023-06-15 RX ORDER — ACETAMINOPHEN 325 MG/1
650 TABLET ORAL EVERY 4 HOURS PRN
Status: DISCONTINUED | OUTPATIENT
Start: 2023-06-15 | End: 2023-06-15

## 2023-06-15 RX ORDER — MORPHINE SULFATE 2 MG/ML
2 INJECTION, SOLUTION INTRAMUSCULAR; INTRAVENOUS ONCE
Status: COMPLETED | OUTPATIENT
Start: 2023-06-15 | End: 2023-06-15

## 2023-06-15 RX ORDER — ACETAMINOPHEN 325 MG/1
TABLET ORAL
Status: COMPLETED
Start: 2023-06-15 | End: 2023-06-15

## 2023-06-15 RX ORDER — DIPHENHYDRAMINE HCL 25 MG
TABLET ORAL
Status: COMPLETED
Start: 2023-06-15 | End: 2023-06-15

## 2023-06-15 RX ADMIN — DIPHENHYDRAMINE HCL 25 MG: 25 TABLET ORAL at 14:45

## 2023-06-15 RX ADMIN — ACETAMINOPHEN 650 MG: 325 TABLET ORAL at 14:45

## 2023-06-15 RX ADMIN — Medication 25 MG: at 14:45

## 2023-06-15 RX ADMIN — MORPHINE SULFATE 2 MG: 2 INJECTION, SOLUTION INTRAMUSCULAR; INTRAVENOUS at 03:17

## 2023-06-15 ASSESSMENT — PAIN - FUNCTIONAL ASSESSMENT: PAIN_FUNCTIONAL_ASSESSMENT: ACTIVITIES ARE NOT PREVENTED

## 2023-06-15 ASSESSMENT — PAIN DESCRIPTION - LOCATION
LOCATION: ABDOMEN
LOCATION: ABDOMEN

## 2023-06-15 ASSESSMENT — PAIN SCALES - GENERAL
PAINLEVEL_OUTOF10: 5
PAINLEVEL_OUTOF10: 8
PAINLEVEL_OUTOF10: 7

## 2023-06-15 ASSESSMENT — PAIN DESCRIPTION - DESCRIPTORS
DESCRIPTORS: DISCOMFORT;CRAMPING
DESCRIPTORS: CRAMPING;DISCOMFORT

## 2023-06-15 ASSESSMENT — PAIN DESCRIPTION - ORIENTATION
ORIENTATION: LOWER
ORIENTATION: LOWER

## 2023-06-15 ASSESSMENT — PAIN DESCRIPTION - PAIN TYPE: TYPE: ACUTE PAIN

## 2023-06-15 ASSESSMENT — PAIN DESCRIPTION - FREQUENCY: FREQUENCY: INTERMITTENT

## 2023-06-15 ASSESSMENT — PAIN DESCRIPTION - ONSET: ONSET: ON-GOING

## 2023-06-15 NOTE — PROGRESS NOTES
Dr. Jacki Real called thru [employer] [facility] to update that patient had not been seen as of yet and patient is requesting pain medication for the untreated headache. No new orders received at this time.

## 2023-06-15 NOTE — PROGRESS NOTES
RN at bedside to readjust efm. Patient sleeping and states she feels comfortable. Call light in reach.

## 2023-06-15 NOTE — PROGRESS NOTES
Dr. Vlad Pinzon updated that patient is requesting pain medication for a headache 7/10. Patient has an allergy to APAP and Ibuprofen.   Orders to consult Internal med for HA

## 2023-06-15 NOTE — PROGRESS NOTES
Dr. Teddy Lala on the unit. Updated on [facility]'s recommendations and that she may be discharged home with office follow up.   Excell Endy for discharge patient to call office for follow up appointment

## 2023-06-15 NOTE — PROGRESS NOTES
OB/GYN SERVICE  Attending antipartum Progress Note      SUBJECTIVE:  Pt w/o complaints    OBJECTIVE      Physical    INTAKE/OUTPUT:  No intake or output data in the 24 hours ending 06/15/23 1852  TEMPERATURE:  Current - Temp: 98.1 °F (36.7 °C);  Max - Temp  Av.2 °F (36.8 °C)  Min: 98.1 °F (36.7 °C)  Max: 98.3 °F (36.8 °C)  RESPIRATIONS RANGE: Resp  Avg: 15.3  Min: 14  Max: 16  PULSE RANGE: Pulse  Av.5  Min: 90  Max: 93  BLOOD PRESSURE RANGE:  Systolic (83HDH), JMH:706 , Min:128 , OED:248  ; Diastolic (03EZI), SUC:62, Min:62, Max:64   CONSTITUTIONAL:  awake, alert, cooperative, no apparent distress, and appears stated age  ABDOMEN:  No scars, normal bowel sounds, soft, non-distended, non-tender, no masses palpated, no hepatosplenomegally and gravid uterus w/o palpable ctxs    Data  CBC:   Lab Results   Component Value Date/Time    WBC 16.9 2022 08:51 PM    RBC 6.14 2022 08:51 PM    HGB 13.8 2022 08:51 PM    HCT 43.5 2022 08:51 PM    MCV 70.8 2022 08:51 PM    MCH 22.5 2022 08:51 PM    MCHC 31.7 2022 08:51 PM    RDW 15.8 2022 08:51 PM     2022 08:51 PM    MPV 9.9 2022 08:51 PM     U/A:    Lab Results   Component Value Date/Time    COLORU Yellow 2023 06:38 PM    PROTEINU Negative 2023 06:38 PM    PHUR 7.0 2023 06:38 PM    WBCUA 1-3 2023 06:38 PM    RBCUA 0-1 2023 06:38 PM    MUCUS Present 2019 11:15 AM    BACTERIA RARE 2023 06:38 PM    CLARITYU Clear 2023 06:38 PM    SPECGRAV 1.015 2023 06:38 PM    LEUKOCYTESUR Negative 2023 06:38 PM    UROBILINOGEN 2.0 2023 06:38 PM    BILIRUBINUR Negative 2023 06:38 PM    BLOODU TRACE-INTACT 2023 06:38 PM    GLUCOSEU Negative 2023 06:38 PM    AMORPHOUS RARE 2015 12:02 AM       Current Inpatient Medications    Current Facility-Administered Medications: acetaminophen (TYLENOL) tablet 650 mg, 650 mg, Oral, Q6H PRN **AND**

## 2023-06-15 NOTE — CONSULTS
tobacco. She reports current drug use. Frequency: 7.00 times per week. Drug: Marijuana Littie Plump). She reports that she does not drink alcohol. Family History:       Problem Relation Age of Onset    Kidney Disease Father     Stroke Mother     Hypertension Mother          PHYSICAL EXAM:  Vitals:  /62   Pulse 90   Temp 98.1 °F (36.7 °C) (Oral)   Resp 16   LMP 10/19/2022 (Exact Date)     General Appearance: alert and oriented to person, place and time and in no acute distress, pregnant  Skin: warm and dry  Head: normocephalic and atraumatic  Eyes: pupils equal, round, extraocular eye movements intact, conjunctivae normal  Pulmonary/Chest: clear to auscultation bilaterally- no wheezes, rales or rhonchi, normal air movement, no respiratory distress  Cardiovascular: normal rate, normal S1 and S2   Abdomen: soft, non-tender, non-distended, normal bowel sounds,   Extremities: no cyanosis, no clubbing and no edema  Neurologic: no cranial nerve deficit and speech normal      LABS:  No results for input(s): NA, K, CL, CO2, BUN, CREATININE, GLUCOSE, CALCIUM in the last 72 hours. No results for input(s): WBC, RBC, HGB, HCT, MCV, MCH, MCHC, RDW, PLT, MPV in the last 72 hours. No results for input(s): POCGLU in the last 72 hours.     CBC:   Lab Results   Component Value Date/Time    WBC 16.9 11/19/2022 08:51 PM    RBC 6.14 11/19/2022 08:51 PM    HGB 13.8 11/19/2022 08:51 PM    HCT 43.5 11/19/2022 08:51 PM    MCV 70.8 11/19/2022 08:51 PM    MCH 22.5 11/19/2022 08:51 PM    MCHC 31.7 11/19/2022 08:51 PM    RDW 15.8 11/19/2022 08:51 PM     11/19/2022 08:51 PM    MPV 9.9 11/19/2022 08:51 PM     BMP:    Lab Results   Component Value Date/Time     11/19/2022 08:51 PM    K 3.3 11/19/2022 08:51 PM     11/19/2022 08:51 PM    CO2 24 11/19/2022 08:51 PM    BUN 10 11/19/2022 08:51 PM    LABALBU 4.7 11/19/2022 08:51 PM    CREATININE 0.7 11/19/2022 08:51 PM    CALCIUM 9.7 11/19/2022 08:51 PM    GFRAA >60

## 2023-06-15 NOTE — PROGRESS NOTES
Notified Dr Megha Cooper on patient. Notified that patient is moni q1-3min and q1-4min. Notified terbutaline was given and IV fluids were also given. Patient fingertip/60/-3. Verbal orders to give celestone, repeat in 24 hours. Consult mfm.

## 2023-06-15 NOTE — PROGRESS NOTES
Notified Dr Zaldivar Paci of patient calling out requesting for pain medication. Patient states she is starting to feel her contractions. Notified contractions are irregular q2-5min. Recent sve at this time, fingertip/60/-3. Verbal orders to give 2mg of morphine x 1.

## 2023-06-15 NOTE — DISCHARGE INSTRUCTIONS
Home Undelivered Discharge Instructions    After Discharge Orders:    Call Dr. Jeffrey Christopher office for appointment. Return to Labor and Delivery on 6/16/2023 before 12:00 (noon) to receive 2nd dose of Celestone. Diet:  normal diet as tolerated    Rest: normal activity as tolerated    Other instructions: Do kick counts once a day on your baby. Choose the time of day your baby is most active. Get in a comfortable lying or sitting position and time how long it takes to feel 10 kicks, twists, turns, swishes, or rolls.  Call your physician or midwife if there have not been 10 kicks in 2 hours    Call physician or midwife, return to Labor and Delivery, call 911, or go to the nearest Emergency Room if: increased leakage or fluid, contractions more than  6 per  1 hour, decreased fetal movement, persistent low back pain or cramping, bleeding from vaginal area, difficulty urinating, pain with urination, difficulty breathing, new calf pain, persistent headache, or vision change

## 2023-06-15 NOTE — PROGRESS NOTES
Assumed patient care. Patient denies lof, denies vb, states +FM, states feeling contractions 7/10. Efm applied. IV fluid bolus started, terbutaline given. Plan of care notified with patient. Call light in reach.

## 2023-06-16 ENCOUNTER — HOSPITAL ENCOUNTER (OUTPATIENT)
Age: 28
Discharge: HOME | End: 2023-06-16
Attending: OBSTETRICS & GYNECOLOGY | Admitting: OBSTETRICS & GYNECOLOGY
Payer: MEDICAID

## 2023-06-16 PROCEDURE — 96372 THER/PROPH/DIAG INJ SC/IM: CPT

## 2023-06-16 PROCEDURE — 6360000002 HC RX W HCPCS: Performed by: OBSTETRICS & GYNECOLOGY

## 2023-06-16 RX ORDER — BETAMETHASONE SODIUM PHOSPHATE AND BETAMETHASONE ACETATE 3; 3 MG/ML; MG/ML
12 INJECTION, SUSPENSION INTRA-ARTICULAR; INTRALESIONAL; INTRAMUSCULAR; SOFT TISSUE ONCE
Status: COMPLETED | OUTPATIENT
Start: 2023-06-16 | End: 2023-06-16

## 2023-06-16 RX ADMIN — BETAMETHASONE SODIUM PHOSPHATE AND BETAMETHASONE ACETATE 12 MG: 3; 3 INJECTION, SUSPENSION INTRA-ARTICULAR; INTRALESIONAL; INTRAMUSCULAR at 13:55

## 2023-06-29 ENCOUNTER — HOSPITAL ENCOUNTER (OUTPATIENT)
Age: 28
Setting detail: OBSERVATION
Discharge: HOME | End: 2023-06-29
Attending: OBSTETRICS & GYNECOLOGY | Admitting: OBSTETRICS & GYNECOLOGY
Payer: MEDICAID

## 2023-06-29 VITALS
HEART RATE: 88 BPM | TEMPERATURE: 97.4 F | SYSTOLIC BLOOD PRESSURE: 114 MMHG | DIASTOLIC BLOOD PRESSURE: 55 MMHG | RESPIRATION RATE: 14 BRPM

## 2023-06-29 PROBLEM — Z3A.33 33 WEEKS GESTATION OF PREGNANCY: Status: RESOLVED | Noted: 2023-06-14 | Resolved: 2023-06-29

## 2023-06-29 PROBLEM — Z3A.35 35 WEEKS GESTATION OF PREGNANCY: Status: ACTIVE | Noted: 2023-06-29

## 2023-06-29 PROBLEM — D56.3 ALPHA THALASSEMIA TRAIT: Status: ACTIVE | Noted: 2023-06-29

## 2023-06-29 PROCEDURE — 99222 1ST HOSP IP/OBS MODERATE 55: CPT | Performed by: ADVANCED PRACTICE MIDWIFE

## 2023-06-29 PROCEDURE — G0378 HOSPITAL OBSERVATION PER HR: HCPCS

## 2023-06-29 RX ORDER — SODIUM CHLORIDE, SODIUM LACTATE, POTASSIUM CHLORIDE, AND CALCIUM CHLORIDE .6; .31; .03; .02 G/100ML; G/100ML; G/100ML; G/100ML
500 INJECTION, SOLUTION INTRAVENOUS ONCE
Status: DISCONTINUED | OUTPATIENT
Start: 2023-06-29 | End: 2023-06-29 | Stop reason: HOSPADM

## 2023-06-29 RX ORDER — SODIUM CHLORIDE, SODIUM LACTATE, POTASSIUM CHLORIDE, CALCIUM CHLORIDE 600; 310; 30; 20 MG/100ML; MG/100ML; MG/100ML; MG/100ML
INJECTION, SOLUTION INTRAVENOUS CONTINUOUS
Status: DISCONTINUED | OUTPATIENT
Start: 2023-06-29 | End: 2023-06-29 | Stop reason: HOSPADM

## 2023-07-23 ENCOUNTER — ANESTHESIA EVENT (OUTPATIENT)
Dept: LABOR AND DELIVERY | Age: 28
DRG: 560 | End: 2023-07-23
Payer: MEDICAID

## 2023-07-23 ENCOUNTER — HOSPITAL ENCOUNTER (INPATIENT)
Age: 28
LOS: 2 days | Discharge: HOME | DRG: 560 | End: 2023-07-25
Attending: OBSTETRICS & GYNECOLOGY | Admitting: OBSTETRICS & GYNECOLOGY
Payer: MEDICAID

## 2023-07-23 ENCOUNTER — ANESTHESIA (OUTPATIENT)
Dept: LABOR AND DELIVERY | Age: 28
DRG: 560 | End: 2023-07-23
Payer: MEDICAID

## 2023-07-23 LAB
ABO + RH BLD: NORMAL
AMPHET UR QL SCN: NEGATIVE
ARM BAND NUMBER: NORMAL
BARBITURATES UR QL SCN: NEGATIVE
BENZODIAZ UR QL: NEGATIVE
BLOOD BANK SAMPLE EXPIRATION: NORMAL
BLOOD GROUP ANTIBODIES SERPL: NEGATIVE
BUPRENORPHINE UR QL: NEGATIVE
CANNABINOIDS UR QL SCN: POSITIVE
COCAINE UR QL SCN: NEGATIVE
ERYTHROCYTE [DISTWIDTH] IN BLOOD BY AUTOMATED COUNT: 16.8 % (ref 11.5–15)
FENTANYL UR QL: NEGATIVE
HCT VFR BLD AUTO: 30.8 % (ref 34–48)
HGB BLD-MCNC: 9.9 G/DL (ref 11.5–15.5)
MCH RBC QN AUTO: 21.5 PG (ref 26–35)
MCHC RBC AUTO-ENTMCNC: 32.1 G/DL (ref 32–34.5)
MCV RBC AUTO: 67 FL (ref 80–99.9)
METHADONE UR QL: NEGATIVE
OPIATES UR QL SCN: NEGATIVE
OXYCODONE UR QL SCN: NEGATIVE
PCP UR QL SCN: NEGATIVE
PLATELET # BLD AUTO: 307 K/UL (ref 130–450)
PMV BLD AUTO: 9.5 FL (ref 7–12)
RBC # BLD AUTO: 4.6 M/UL (ref 3.5–5.5)
TEST INFORMATION: ABNORMAL
WBC OTHER # BLD: 11 K/UL (ref 4.5–11.5)

## 2023-07-23 PROCEDURE — 80307 DRUG TEST PRSMV CHEM ANLYZR: CPT

## 2023-07-23 PROCEDURE — 6370000000 HC RX 637 (ALT 250 FOR IP): Performed by: OBSTETRICS & GYNECOLOGY

## 2023-07-23 PROCEDURE — 2580000003 HC RX 258: Performed by: OBSTETRICS & GYNECOLOGY

## 2023-07-23 PROCEDURE — 1220000001 HC SEMI PRIVATE L&D R&B

## 2023-07-23 PROCEDURE — 6360000002 HC RX W HCPCS

## 2023-07-23 PROCEDURE — G0480 DRUG TEST DEF 1-7 CLASSES: HCPCS

## 2023-07-23 PROCEDURE — 85027 COMPLETE CBC AUTOMATED: CPT

## 2023-07-23 PROCEDURE — 86850 RBC ANTIBODY SCREEN: CPT

## 2023-07-23 PROCEDURE — 86901 BLOOD TYPING SEROLOGIC RH(D): CPT

## 2023-07-23 PROCEDURE — 86900 BLOOD TYPING SEROLOGIC ABO: CPT

## 2023-07-23 RX ORDER — MORPHINE SULFATE 2 MG/ML
2 INJECTION, SOLUTION INTRAMUSCULAR; INTRAVENOUS
Status: DISCONTINUED | OUTPATIENT
Start: 2023-07-23 | End: 2023-07-25 | Stop reason: HOSPADM

## 2023-07-23 RX ORDER — ACETAMINOPHEN 650 MG
TABLET, EXTENDED RELEASE ORAL PRN
Status: DISCONTINUED | OUTPATIENT
Start: 2023-07-23 | End: 2023-07-25 | Stop reason: HOSPADM

## 2023-07-23 RX ORDER — LIDOCAINE HYDROCHLORIDE 10 MG/ML
20 INJECTION, SOLUTION EPIDURAL; INFILTRATION; INTRACAUDAL; PERINEURAL ONCE
Status: COMPLETED | OUTPATIENT
Start: 2023-07-23 | End: 2023-07-24

## 2023-07-23 RX ORDER — SODIUM CHLORIDE 9 MG/ML
25 INJECTION, SOLUTION INTRAVENOUS PRN
Status: DISCONTINUED | OUTPATIENT
Start: 2023-07-23 | End: 2023-07-24 | Stop reason: HOSPADM

## 2023-07-23 RX ORDER — CARBOPROST TROMETHAMINE 250 UG/ML
250 INJECTION, SOLUTION INTRAMUSCULAR PRN
Status: DISCONTINUED | OUTPATIENT
Start: 2023-07-23 | End: 2023-07-24 | Stop reason: HOSPADM

## 2023-07-23 RX ORDER — ONDANSETRON 2 MG/ML
4 INJECTION INTRAMUSCULAR; INTRAVENOUS EVERY 6 HOURS PRN
Status: DISCONTINUED | OUTPATIENT
Start: 2023-07-23 | End: 2023-07-24 | Stop reason: HOSPADM

## 2023-07-23 RX ORDER — SODIUM CHLORIDE, SODIUM LACTATE, POTASSIUM CHLORIDE, AND CALCIUM CHLORIDE .6; .31; .03; .02 G/100ML; G/100ML; G/100ML; G/100ML
500 INJECTION, SOLUTION INTRAVENOUS PRN
Status: DISCONTINUED | OUTPATIENT
Start: 2023-07-23 | End: 2023-07-24 | Stop reason: HOSPADM

## 2023-07-23 RX ORDER — METHYLERGONOVINE MALEATE 0.2 MG/ML
200 INJECTION INTRAVENOUS PRN
Status: DISCONTINUED | OUTPATIENT
Start: 2023-07-23 | End: 2023-07-24 | Stop reason: HOSPADM

## 2023-07-23 RX ORDER — NICOTINE 21 MG/24HR
1 PATCH, TRANSDERMAL 24 HOURS TRANSDERMAL DAILY
Status: DISCONTINUED | OUTPATIENT
Start: 2023-07-23 | End: 2023-07-25 | Stop reason: HOSPADM

## 2023-07-23 RX ORDER — SODIUM CHLORIDE 0.9 % (FLUSH) 0.9 %
5-40 SYRINGE (ML) INJECTION PRN
Status: DISCONTINUED | OUTPATIENT
Start: 2023-07-23 | End: 2023-07-24 | Stop reason: HOSPADM

## 2023-07-23 RX ORDER — SODIUM CHLORIDE 0.9 % (FLUSH) 0.9 %
5-40 SYRINGE (ML) INJECTION EVERY 12 HOURS SCHEDULED
Status: DISCONTINUED | OUTPATIENT
Start: 2023-07-23 | End: 2023-07-24 | Stop reason: HOSPADM

## 2023-07-23 RX ORDER — SODIUM CHLORIDE, SODIUM LACTATE, POTASSIUM CHLORIDE, CALCIUM CHLORIDE 600; 310; 30; 20 MG/100ML; MG/100ML; MG/100ML; MG/100ML
INJECTION, SOLUTION INTRAVENOUS CONTINUOUS
Status: DISCONTINUED | OUTPATIENT
Start: 2023-07-23 | End: 2023-07-25 | Stop reason: HOSPADM

## 2023-07-23 RX ORDER — LIDOCAINE HYDROCHLORIDE 10 MG/ML
INJECTION, SOLUTION INFILTRATION; PERINEURAL
Status: DISCONTINUED
Start: 2023-07-23 | End: 2023-07-23 | Stop reason: WASHOUT

## 2023-07-23 RX ORDER — NALOXONE HYDROCHLORIDE 0.4 MG/ML
INJECTION, SOLUTION INTRAMUSCULAR; INTRAVENOUS; SUBCUTANEOUS PRN
Status: DISCONTINUED | OUTPATIENT
Start: 2023-07-23 | End: 2023-07-24 | Stop reason: HOSPADM

## 2023-07-23 RX ORDER — SODIUM CHLORIDE, SODIUM LACTATE, POTASSIUM CHLORIDE, AND CALCIUM CHLORIDE .6; .31; .03; .02 G/100ML; G/100ML; G/100ML; G/100ML
1000 INJECTION, SOLUTION INTRAVENOUS PRN
Status: DISCONTINUED | OUTPATIENT
Start: 2023-07-23 | End: 2023-07-24 | Stop reason: HOSPADM

## 2023-07-23 RX ORDER — MISOPROSTOL 200 UG/1
800 TABLET ORAL PRN
Status: DISCONTINUED | OUTPATIENT
Start: 2023-07-23 | End: 2023-07-24 | Stop reason: HOSPADM

## 2023-07-23 RX ADMIN — SODIUM CHLORIDE, POTASSIUM CHLORIDE, SODIUM LACTATE AND CALCIUM CHLORIDE: 600; 310; 30; 20 INJECTION, SOLUTION INTRAVENOUS at 11:15

## 2023-07-23 RX ADMIN — Medication 1 MILLI-UNITS/MIN: at 17:10

## 2023-07-23 ASSESSMENT — LIFESTYLE VARIABLES: SMOKING_STATUS: 1

## 2023-07-23 NOTE — PROGRESS NOTES
Admit is complete. Pt in bed and out of bed trying to gain comfort.  at the bedside. Pt aware of pop maddie and ice chips only. FHR cat 1. Ctx's are q1-3-2 minutes apart. Consents signed and room is ready for delivery.

## 2023-07-23 NOTE — PROGRESS NOTES
Per Pt's requested asked Dr. Chyna Ayon for a nicotine patch order. Dr. Chyna Ayon declined to order this media=cation. Will do a SVE in the next hour or so and update Dr. Gino Conner. Will ask Dr. Gino Conner for the nicotine patch then. Pt is aware and ok with this plan.

## 2023-07-23 NOTE — H&P
Kit Santana is a 32 y.o. female patient  presents with contractions since 0600 q 3-6'. Denies LOF. GBS negative. No diagnosis found. Past Medical History:   Diagnosis Date    Abnormal Pap smear of cervix     most recent pap smear was abnormal, pt states due to trich and BV    Alpha thalassemia trait     Anemia     Mental disorder     depression and anxiety - stopped taking medication in     Postpartum depression     STD (sexually transmitted disease)     chlamydia in 10/2022 - treated, tested positive for trich    Trauma     in      OB History          8    Para   6    Term   6            AB   1    Living   6         SAB   1    IAB        Ectopic        Molar        Multiple   0    Live Births   6              38w5d  Estimated Date of Delivery: 23  Allergies   Allergen Reactions    Motrin [Ibuprofen] Hives     Pt states allergy is to over the counter only    Tylenol [Acetaminophen] Hives     Pt states allergy is to over the counter tylenol only     Active Problems:    * No active hospital problems. *  Resolved Problems:    * No resolved hospital problems. *    Last menstrual period 10/19/2022, unknown if currently breastfeeding.     History  Exam  Cv rr  Lungs clear  Abd gravid  Cx 3cm/80%/-2/tx  Assessment & Plan  IUP @ 38 5/7 weeks  Derek Rozina  Notify Dr Sharon Wilcox MD  2023

## 2023-07-24 LAB
ABNORMAL SPECIMEN VALIDITY TEST: NORMAL
INTEGRITY CHECK, CREATININE, URINE: 43.6
INTEGRITY CHECK, OXIDANT, URINE: <40
INTEGRITY CHECK, PH, URINE: 6.8
INTEGRITY CHECK, SPECIFIC GRAVITY, URINE: 1.01
THC NORMALIZED, QUANTITIATIVE, URINE: 88.1 NG/ML
THC-COOH, QUANTITATIVE, URINE: 38.4 NG/ML

## 2023-07-24 PROCEDURE — 1220000000 HC SEMI PRIVATE OB R&B

## 2023-07-24 PROCEDURE — 59409 OBSTETRICAL CARE: CPT | Performed by: OBSTETRICS & GYNECOLOGY

## 2023-07-24 PROCEDURE — 6370000000 HC RX 637 (ALT 250 FOR IP): Performed by: OBSTETRICS & GYNECOLOGY

## 2023-07-24 PROCEDURE — 51701 INSERT BLADDER CATHETER: CPT

## 2023-07-24 PROCEDURE — 6370000000 HC RX 637 (ALT 250 FOR IP)

## 2023-07-24 PROCEDURE — 2580000003 HC RX 258: Performed by: OBSTETRICS & GYNECOLOGY

## 2023-07-24 PROCEDURE — 90715 TDAP VACCINE 7 YRS/> IM: CPT | Performed by: OBSTETRICS & GYNECOLOGY

## 2023-07-24 PROCEDURE — 2500000003 HC RX 250 WO HCPCS: Performed by: ANESTHESIOLOGY

## 2023-07-24 PROCEDURE — 6360000002 HC RX W HCPCS: Performed by: OBSTETRICS & GYNECOLOGY

## 2023-07-24 PROCEDURE — 90471 IMMUNIZATION ADMIN: CPT | Performed by: OBSTETRICS & GYNECOLOGY

## 2023-07-24 PROCEDURE — 3700000025 EPIDURAL BLOCK: Performed by: ANESTHESIOLOGY

## 2023-07-24 PROCEDURE — 7200000001 HC VAGINAL DELIVERY

## 2023-07-24 RX ORDER — SODIUM CHLORIDE 0.9 % (FLUSH) 0.9 %
5-40 SYRINGE (ML) INJECTION EVERY 12 HOURS SCHEDULED
Status: DISCONTINUED | OUTPATIENT
Start: 2023-07-24 | End: 2023-07-25 | Stop reason: HOSPADM

## 2023-07-24 RX ORDER — MODIFIED LANOLIN
OINTMENT (GRAM) TOPICAL PRN
Status: DISCONTINUED | OUTPATIENT
Start: 2023-07-24 | End: 2023-07-25 | Stop reason: HOSPADM

## 2023-07-24 RX ORDER — DIPHENHYDRAMINE HCL 25 MG
TABLET ORAL
Status: COMPLETED
Start: 2023-07-24 | End: 2023-07-24

## 2023-07-24 RX ORDER — ACETAMINOPHEN 500 MG
1000 TABLET ORAL EVERY 8 HOURS SCHEDULED
Status: DISCONTINUED | OUTPATIENT
Start: 2023-07-24 | End: 2023-07-25 | Stop reason: HOSPADM

## 2023-07-24 RX ORDER — DOCUSATE SODIUM 100 MG/1
100 CAPSULE, LIQUID FILLED ORAL 2 TIMES DAILY
Status: DISCONTINUED | OUTPATIENT
Start: 2023-07-24 | End: 2023-07-25 | Stop reason: HOSPADM

## 2023-07-24 RX ORDER — DIPHENHYDRAMINE HCL 25 MG
25 TABLET ORAL EVERY 4 HOURS PRN
Status: DISCONTINUED | OUTPATIENT
Start: 2023-07-24 | End: 2023-07-25 | Stop reason: HOSPADM

## 2023-07-24 RX ORDER — SODIUM CHLORIDE, SODIUM LACTATE, POTASSIUM CHLORIDE, CALCIUM CHLORIDE 600; 310; 30; 20 MG/100ML; MG/100ML; MG/100ML; MG/100ML
INJECTION, SOLUTION INTRAVENOUS CONTINUOUS
Status: DISCONTINUED | OUTPATIENT
Start: 2023-07-24 | End: 2023-07-25 | Stop reason: HOSPADM

## 2023-07-24 RX ORDER — MISOPROSTOL 200 UG/1
200 TABLET ORAL PRN
Status: DISCONTINUED | OUTPATIENT
Start: 2023-07-24 | End: 2023-07-25 | Stop reason: HOSPADM

## 2023-07-24 RX ORDER — SODIUM CHLORIDE 9 MG/ML
INJECTION, SOLUTION INTRAVENOUS PRN
Status: DISCONTINUED | OUTPATIENT
Start: 2023-07-24 | End: 2023-07-25 | Stop reason: HOSPADM

## 2023-07-24 RX ORDER — FERROUS SULFATE 325(65) MG
325 TABLET ORAL 2 TIMES DAILY WITH MEALS
Status: DISCONTINUED | OUTPATIENT
Start: 2023-07-24 | End: 2023-07-25 | Stop reason: HOSPADM

## 2023-07-24 RX ORDER — SODIUM CHLORIDE 0.9 % (FLUSH) 0.9 %
5-40 SYRINGE (ML) INJECTION PRN
Status: DISCONTINUED | OUTPATIENT
Start: 2023-07-24 | End: 2023-07-25 | Stop reason: HOSPADM

## 2023-07-24 RX ORDER — ONDANSETRON 4 MG/1
8 TABLET, ORALLY DISINTEGRATING ORAL EVERY 8 HOURS PRN
Status: DISCONTINUED | OUTPATIENT
Start: 2023-07-24 | End: 2023-07-25 | Stop reason: HOSPADM

## 2023-07-24 RX ORDER — NALOXONE HYDROCHLORIDE 0.4 MG/ML
INJECTION, SOLUTION INTRAMUSCULAR; INTRAVENOUS; SUBCUTANEOUS PRN
Status: DISCONTINUED | OUTPATIENT
Start: 2023-07-24 | End: 2023-07-24 | Stop reason: HOSPADM

## 2023-07-24 RX ORDER — CARBOPROST TROMETHAMINE 250 UG/ML
250 INJECTION, SOLUTION INTRAMUSCULAR PRN
Status: DISCONTINUED | OUTPATIENT
Start: 2023-07-24 | End: 2023-07-25 | Stop reason: HOSPADM

## 2023-07-24 RX ORDER — MISOPROSTOL 200 UG/1
800 TABLET ORAL PRN
Status: DISCONTINUED | OUTPATIENT
Start: 2023-07-24 | End: 2023-07-25 | Stop reason: HOSPADM

## 2023-07-24 RX ORDER — ONDANSETRON 2 MG/ML
4 INJECTION INTRAMUSCULAR; INTRAVENOUS EVERY 6 HOURS PRN
Status: DISCONTINUED | OUTPATIENT
Start: 2023-07-24 | End: 2023-07-24 | Stop reason: HOSPADM

## 2023-07-24 RX ORDER — METHYLERGONOVINE MALEATE 0.2 MG/ML
200 INJECTION INTRAVENOUS PRN
Status: DISCONTINUED | OUTPATIENT
Start: 2023-07-24 | End: 2023-07-25 | Stop reason: HOSPADM

## 2023-07-24 RX ORDER — IBUPROFEN 800 MG/1
800 TABLET ORAL EVERY 8 HOURS PRN
Status: DISCONTINUED | OUTPATIENT
Start: 2023-07-24 | End: 2023-07-25 | Stop reason: HOSPADM

## 2023-07-24 RX ADMIN — Medication 87.3 MILLI-UNITS/MIN: at 09:30

## 2023-07-24 RX ADMIN — DIPHENHYDRAMINE HCL 25 MG: 25 TABLET ORAL at 18:10

## 2023-07-24 RX ADMIN — FERROUS SULFATE TAB 325 MG (65 MG ELEMENTAL FE) 325 MG: 325 (65 FE) TAB at 17:38

## 2023-07-24 RX ADMIN — IBUPROFEN 800 MG: 800 TABLET, FILM COATED ORAL at 14:59

## 2023-07-24 RX ADMIN — MORPHINE SULFATE 2 MG: 2 INJECTION, SOLUTION INTRAMUSCULAR; INTRAVENOUS at 05:30

## 2023-07-24 RX ADMIN — MORPHINE SULFATE 2 MG: 2 INJECTION, SOLUTION INTRAMUSCULAR; INTRAVENOUS at 00:05

## 2023-07-24 RX ADMIN — Medication 166.7 ML: at 08:58

## 2023-07-24 RX ADMIN — DIPHENHYDRAMINE HCL 25 MG: 25 TABLET ORAL at 14:59

## 2023-07-24 RX ADMIN — SODIUM CHLORIDE, POTASSIUM CHLORIDE, SODIUM LACTATE AND CALCIUM CHLORIDE 1000 ML: 600; 310; 30; 20 INJECTION, SOLUTION INTRAVENOUS at 07:15

## 2023-07-24 RX ADMIN — DOCUSATE SODIUM 100 MG: 100 CAPSULE, LIQUID FILLED ORAL at 20:06

## 2023-07-24 RX ADMIN — Medication 15 ML/HR: at 07:40

## 2023-07-24 RX ADMIN — ACETAMINOPHEN 1000 MG: 500 TABLET ORAL at 17:38

## 2023-07-24 RX ADMIN — TETANUS TOXOID, REDUCED DIPHTHERIA TOXOID AND ACELLULAR PERTUSSIS VACCINE, ADSORBED 0.5 ML: 5; 2.5; 8; 8; 2.5 SUSPENSION INTRAMUSCULAR at 20:08

## 2023-07-24 ASSESSMENT — PAIN DESCRIPTION - DESCRIPTORS
DESCRIPTORS: CRAMPING
DESCRIPTORS: PRESSURE
DESCRIPTORS: CRAMPING

## 2023-07-24 ASSESSMENT — PAIN DESCRIPTION - LOCATION
LOCATION: ABDOMEN
LOCATION: ABDOMEN

## 2023-07-24 ASSESSMENT — PAIN SCALES - GENERAL
PAINLEVEL_OUTOF10: 6
PAINLEVEL_OUTOF10: 8

## 2023-07-24 ASSESSMENT — PAIN - FUNCTIONAL ASSESSMENT
PAIN_FUNCTIONAL_ASSESSMENT: ACTIVITIES ARE NOT PREVENTED
PAIN_FUNCTIONAL_ASSESSMENT: ACTIVITIES ARE NOT PREVENTED

## 2023-07-24 NOTE — PROGRESS NOTES
Got pt up to the bathroom for the first time. Pt did not complain of being lightheaded or dizzy at this time, voided, No clots, educated on melanie care, and helped pt back to bed. RN aware.

## 2023-07-24 NOTE — L&D DELIVERY NOTE
of a male infant at 80 in the KALI position loose nuchal reduced. Apgars 9/9. Placenta delivered spont intact. Ebl<200. Rectum cervix intact. No epis,no laceration. Mom and baby recovering stable.

## 2023-07-24 NOTE — PROGRESS NOTES
Pt ambulated to bathroom via stand by assist, pt tolerated well, pt voided melanie care done underwear and pads applied. Pt to wheelchair and to mother baby.

## 2023-07-24 NOTE — PROGRESS NOTES
Pt called out stating she needs to push, pitocin turned off, pt moved, SVE done, called out for house officer and Dr. Juliana Palma to come for delivery.

## 2023-07-24 NOTE — FLOWSHEET NOTE
Patient admitted into room and oriented to surroundings. Introduced self and wrote this RN's name and phone extension on patient's white board. Phone and nurse's call light at patient's bedside and instructed to use for any needs. Patient instructed on new admission informational packet at bedside with information on infant testing to be done when infant is 24 hours old including [facility] labs, 24 hours blood sugar, and CCHD. Patient instructed on mom baby unit policies and procedures including need to keep infant in bassinet for transport in hallways and for infant to sleep alone, on back, in an empty bassinet. Patient also instructed patient on unit visitation policy. Patient verbalized understanding of all of the above.

## 2023-07-24 NOTE — LACTATION NOTE
[address] multiparous mom breast fed for the longest 5 months. Mom would like to go longer with this baby, who she says is breastfeeding well. Discussed frequency and duration of breastfeeds and signs of adequate milk transfer. Encouraged mom to hand express drops of colostrum at the start of a  breastfeed. Recommended to avoid a pacifier for three weeks or until breastfeeding is well established. Mom needs an electric breast pump for home. Went over breastfeeding resources. Encouraged mom to call us with questions or concerns or for assistance.

## 2023-07-24 NOTE — ANESTHESIA PROCEDURE NOTES
Epidural Block    Patient location during procedure: OB  Start time: 7/24/2023 7:35 AM  End time: 7/24/2023 7:37 AM  Reason for block: labor epidural  Staffing  Performed: resident/CRNA   Anesthesiologist: Yannick Gonzalez DO  Resident/CRNA: Agnieszka Morelos APRN - CRNA  Epidural  Patient position: sitting  Prep: Betadine  Patient monitoring: cardiac monitor, continuous pulse ox and frequent blood pressure checks  Approach: midline  Location: L3-4  Injection technique: BENJAMIN saline  Provider prep: mask and sterile gloves  Needle  Needle type: Tuohy   Needle gauge: 18 G  Needle length: 3.5 in  Needle insertion depth: 6 cm  Catheter type: end hole  Catheter size: 20 G.   Catheter at skin depth: 12 cm  Test dose: negativeCatheter Secured: tegaderm and tape  Assessment  Hemodynamics: stable  Attempts: 1  Outcomes: uncomplicated and patient tolerated procedure well  Preanesthetic Checklist  Completed: patient identified, IV checked, site marked, risks and benefits discussed, surgical/procedural consents, equipment checked, pre-op evaluation, timeout performed, anesthesia consent given, oxygen available and monitors applied/VS acknowledged

## 2023-07-25 VITALS
DIASTOLIC BLOOD PRESSURE: 60 MMHG | OXYGEN SATURATION: 98 % | RESPIRATION RATE: 16 BRPM | HEART RATE: 89 BPM | TEMPERATURE: 98.1 F | SYSTOLIC BLOOD PRESSURE: 111 MMHG

## 2023-07-25 LAB
ERYTHROCYTE [DISTWIDTH] IN BLOOD BY AUTOMATED COUNT: 17 % (ref 11.5–15)
HCT VFR BLD AUTO: 32 % (ref 34–48)
HGB BLD-MCNC: 10.1 G/DL (ref 11.5–15.5)
MCH RBC QN AUTO: 21.4 PG (ref 26–35)
MCHC RBC AUTO-ENTMCNC: 31.6 G/DL (ref 32–34.5)
MCV RBC AUTO: 67.7 FL (ref 80–99.9)
PLATELET # BLD AUTO: 303 K/UL (ref 130–450)
PMV BLD AUTO: 10.1 FL (ref 7–12)
RBC # BLD AUTO: 4.73 M/UL (ref 3.5–5.5)
WBC OTHER # BLD: 15.9 K/UL (ref 4.5–11.5)

## 2023-07-25 PROCEDURE — 85027 COMPLETE CBC AUTOMATED: CPT

## 2023-07-25 PROCEDURE — 6370000000 HC RX 637 (ALT 250 FOR IP): Performed by: OBSTETRICS & GYNECOLOGY

## 2023-07-25 RX ORDER — IBUPROFEN 800 MG/1
800 TABLET ORAL EVERY 8 HOURS PRN
Qty: 60 TABLET | Refills: 1 | Status: SHIPPED | OUTPATIENT
Start: 2023-07-25

## 2023-07-25 RX ORDER — MODIFIED LANOLIN
1 OINTMENT (GRAM) TOPICAL PRN
Qty: 1 EACH | Refills: 3 | Status: SHIPPED | OUTPATIENT
Start: 2023-07-25

## 2023-07-25 RX ADMIN — FERROUS SULFATE TAB 325 MG (65 MG ELEMENTAL FE) 325 MG: 325 (65 FE) TAB at 10:40

## 2023-07-25 RX ADMIN — IBUPROFEN 800 MG: 800 TABLET, FILM COATED ORAL at 06:17

## 2023-07-25 RX ADMIN — DIPHENHYDRAMINE HCL 25 MG: 25 TABLET ORAL at 14:03

## 2023-07-25 RX ADMIN — DOCUSATE SODIUM 100 MG: 100 CAPSULE, LIQUID FILLED ORAL at 10:40

## 2023-07-25 RX ADMIN — IBUPROFEN 800 MG: 800 TABLET, FILM COATED ORAL at 14:03

## 2023-07-25 RX ADMIN — DIPHENHYDRAMINE HCL 25 MG: 25 TABLET ORAL at 06:17

## 2023-07-25 RX ADMIN — ACETAMINOPHEN 1000 MG: 500 TABLET ORAL at 10:39

## 2023-07-25 RX ADMIN — DIPHENHYDRAMINE HCL 25 MG: 25 TABLET ORAL at 10:40

## 2023-07-25 ASSESSMENT — PAIN DESCRIPTION - DESCRIPTORS
DESCRIPTORS: ACHING;CRAMPING;DISCOMFORT
DESCRIPTORS: CRAMPING
DESCRIPTORS: ACHING;CRAMPING

## 2023-07-25 ASSESSMENT — PAIN DESCRIPTION - ORIENTATION: ORIENTATION: LOWER

## 2023-07-25 ASSESSMENT — PAIN DESCRIPTION - LOCATION
LOCATION: ABDOMEN
LOCATION: ABDOMEN
LOCATION: ABDOMEN;HEAD

## 2023-07-25 ASSESSMENT — PAIN SCALES - GENERAL
PAINLEVEL_OUTOF10: 9
PAINLEVEL_OUTOF10: 5
PAINLEVEL_OUTOF10: 3

## 2023-07-25 NOTE — DISCHARGE INSTRUCTIONS
Follow-up with your OB doctor in  6 weeks for vaginal delivery unless otherwise instructed. Call office for an appointment. For breastfeeding support, you can contact our lactation specialists at [phone] or [phone]    DIET  Eat a well balanced diet focusing on foods high in fiber and protein  Drink plenty of fluids especially water. To avoid constipation you may take a mild stool softener as recommended by your doctor or midwife. ACTIVITY  Gradually increase your activity. Resume exercise regimen only after advised by your doctor or midwife. Avoid lifting anything heavier than your baby or a gallon of milk for SIX weeks. Avoid driving until your doctor or midwife has given their approval.  David Poot slowly from a lying to sitting and then a standing position. Climb stairs one at a time. Use caution when carrying your baby up and down the stairs. No sexual activity for 6 weeks or until advised by your doctor - Nothing in vagina: intercourse, tampons, or douching. Be prepared to discuss family planning at your follow-up OB visit. You may feel tired or have a lack of energy. You may continue your prenatal vitamin to replenish nutrients post delivery. Nap when baby naps to catch up on sleep. May return to work or school in 6 weeks or as directed by OB. EMOTIONS  You may feed pedro, sad, teary, & overwhelmed. Contact your OB provider if you feel you may be showing signs of postpartum depression, or have thoughts of harming yourself or your infant. If infant will not stop crying, contact another adult for help or place infant in their crib on their back and take a break. NEVER shake your infant. BLEEDING  Vaginal bleeding will decrease in amount over the next few weeks. You will notice that as your activity increases, your flow may increase. This is your body's way of telling you, you need to take things easier and rest more often.   Call your OB/ER if you are saturating more than

## 2023-07-25 NOTE — CARE COORDINATION
SW Discharge Planning    SW called [facility] ( [phone]) and spoke with , Stuart Abreu, who reported that [facility] ( [phone]) will NOT be involved     PLAN    Baby CAN be discharged home when medically ready, children services will NOT be involved at this time.      Electronically signed by JUAN FRANCISCO Roper on 7/25/2023 at 2:09 PM

## 2023-07-25 NOTE — LACTATION NOTE
Mom continues to breast and formula feed baby with the complaint of uterine cramping. Explained the normalcy of this and it will subside. EBP script not signed. Instructed mom to call me when it is signed.

## 2023-07-25 NOTE — ANESTHESIA POSTPROCEDURE EVALUATION
Department of Anesthesiology  Postprocedure Note    Patient: Lorena Quintero  MRN: [id number]  YOB: 1995  Date of evaluation: 7/25/2023      Procedure Summary     Date: 07/24/23 Room / Location:     Anesthesia Start: 0730 Anesthesia Stop: [place]    Procedure: Labor Analgesia Diagnosis:     Scheduled Providers:  Responsible Provider: Tyler Rucker DO    Anesthesia Type: general, spinal, epidural ASA Status: 3          Anesthesia Type: No value filed.     Anabell Phase I: Anabell Score: 10    Anabell Phase II: Anabell Score: 10      Anesthesia Post Evaluation    Patient location during evaluation: bedside  Patient participation: complete - patient participated  Level of consciousness: awake and alert  Pain score: 2  Airway patency: patent  Nausea & Vomiting: no nausea and no vomiting  Complications: no  Cardiovascular status: blood pressure returned to baseline and hemodynamically stable  Respiratory status: acceptable  Hydration status: stable

## 2023-07-25 NOTE — CARE COORDINATION
SW Discharge Planning   SW received consult for \" positive UDS for marijuania\"    NELLY met with Danya Kingstonabiilo ( 11/19/95) ( [phone]) mother to baby boy Elisa Torres . Jamshid Mathur reported father to be Marcio Hopper ( 8/18/93)  and reported that she resides with him and her children,  Brea Donato (2/17/10) , Clare Linares II (8/25/14) Roshni Miller (3/26/16) Dahiana Fernando (3/11/17) Quang Samuels ( 5/19/18) and Toro Mcnair ( 5/22/19). Jamshid Mathur reported that both she and Tita Mohr are currently unemployed and that baby will be added to their [employer]. Jamshid Mathur reported having all needed infant items for baby including a bassinet and car seat. Per Quentin Aleman prenatal care was with Dr. Kate Mayen and pediatric care will be with Dr. Kate Mayen. Jamshid Mathur denied any current or past history of domestic violence or legal history. Jamshid Mathur did report having anxiety and depression and reported that she recently made a counseling appointment with [facility]. Vladislav Gonsalez has been previously involved, once when Enzo Quintana left the house and walked to school by himself at age 10, and when Weston Gonzalez was born due to [facility] usage during pregnancy. Genet Porter stated and stated understanding for he need for a [facility] ( [phone])  referral.      [facility] Piedad Rogers in intake ( [phone]).      PLAN     Baby can NOT be discharged home until [facility] provides disposition  SW to continue communication with [facility] ( [phone])     Electronically signed by Moriah Cohen on 7/25/2023 at 10:03 AM

## 2023-07-25 NOTE — DISCHARGE SUMMARY
Obstetrical Discharge Form    Primary OB Clinician: Gaetano Geller M33, FACOG  Postpartum 1 Day #    Gestational Age at time of delivery: 37 weeks and 6 days    Date of Delivery: 2023; Time of Delivery:     Delivery Type: spontaneous vaginal delivery    Baby: Liveborn male,     Intrapartum complications: None    Laceration: none    Episiotomy: none,    Feeding method: both breast and bottle - Similac with iron    Rh Immune globulin given: no    Rubella vaccine given: no    Discharge Date: 2023; Discharge Time: 0192    Early Discharge:  YES-Maternal-[facility] home visit declined by patient. Condition at time of discharge home is good as well as disposition    Plan:     Follow-up appointment with Dr. Jose Alfredo Aj in 6 weeks.

## 2024-03-19 ENCOUNTER — ANCILLARY PROCEDURE (OUTPATIENT)
Dept: OBGYN CLINIC | Age: 29
End: 2024-03-19
Payer: MEDICAID

## 2024-03-19 ENCOUNTER — INITIAL PRENATAL (OUTPATIENT)
Dept: OBGYN CLINIC | Age: 29
End: 2024-03-19
Payer: MEDICAID

## 2024-03-19 VITALS
BODY MASS INDEX: 36.01 KG/M2 | HEART RATE: 87 BPM | DIASTOLIC BLOOD PRESSURE: 69 MMHG | SYSTOLIC BLOOD PRESSURE: 99 MMHG | WEIGHT: 190.6 LBS

## 2024-03-19 DIAGNOSIS — D64.9 ANEMIA, UNSPECIFIED TYPE: ICD-10-CM

## 2024-03-19 DIAGNOSIS — O99.340 DEPRESSION AFFECTING PREGNANCY: ICD-10-CM

## 2024-03-19 DIAGNOSIS — F41.9 ANXIETY DURING PREGNANCY: ICD-10-CM

## 2024-03-19 DIAGNOSIS — Z3A.26 26 WEEKS GESTATION OF PREGNANCY: Primary | ICD-10-CM

## 2024-03-19 DIAGNOSIS — Z64.1 GRAND MULTIPARA: ICD-10-CM

## 2024-03-19 DIAGNOSIS — O99.340 ANXIETY DURING PREGNANCY: ICD-10-CM

## 2024-03-19 DIAGNOSIS — R82.90 ABNORMAL URINALYSIS: ICD-10-CM

## 2024-03-19 DIAGNOSIS — F32.A DEPRESSION AFFECTING PREGNANCY: ICD-10-CM

## 2024-03-19 DIAGNOSIS — D56.3 ALPHA THALASSEMIA TRAIT: ICD-10-CM

## 2024-03-19 PROCEDURE — G8419 CALC BMI OUT NRM PARAM NOF/U: HCPCS | Performed by: OBSTETRICS & GYNECOLOGY

## 2024-03-19 PROCEDURE — 76811 OB US DETAILED SNGL FETUS: CPT | Performed by: OBSTETRICS & GYNECOLOGY

## 2024-03-19 PROCEDURE — 99203 OFFICE O/P NEW LOW 30 MIN: CPT | Performed by: OBSTETRICS & GYNECOLOGY

## 2024-03-19 PROCEDURE — 76817 TRANSVAGINAL US OBSTETRIC: CPT | Performed by: OBSTETRICS & GYNECOLOGY

## 2024-03-19 PROCEDURE — H1000 PRENATAL CARE ATRISK ASSESSM: HCPCS | Performed by: OBSTETRICS & GYNECOLOGY

## 2024-03-19 PROCEDURE — 99245 OFF/OP CONSLTJ NEW/EST HI 55: CPT | Performed by: OBSTETRICS & GYNECOLOGY

## 2024-03-19 PROCEDURE — G8427 DOCREV CUR MEDS BY ELIG CLIN: HCPCS | Performed by: OBSTETRICS & GYNECOLOGY

## 2024-03-19 PROCEDURE — G8484 FLU IMMUNIZE NO ADMIN: HCPCS | Performed by: OBSTETRICS & GYNECOLOGY

## 2024-03-19 RX ORDER — PNV NO.95/FERROUS FUM/FOLIC AC 28MG-0.8MG
1 TABLET ORAL DAILY
COMMUNITY
Start: 2024-02-02

## 2024-03-19 NOTE — PROGRESS NOTES
2024      Yovani Garber MD  [facility], [address] [place] [address]     RE:  RBUY KAMINSKI  : 1995   AGE: 28 y.o.    This report has been created using voice recognition software. It may contain errors which are inherent in voice recognition technology.    Dear Dr. Garber:    I had the pleasure of meeting with Ms. Kaminski for a consultation.  As you know, Ms. Kaminski is a 28 y.o.  at 26w1d (early ultrasound) who was referred to our office for counseling secondary to genetic counseling.    The patient's medical records and laboratory workup were reviewed. The patient's history was reviewed and outlined below.    Today, Ms. Kaminski reports that she feels well.  She notes good fetal movement and denies any symptoms of leaking of fluid, vaginal bleeding, and/or contractions.  She had a fetal ultrasound that was notable for the following.  There is a single intrauterine gestation in a cephalic presentation with a heart rate of 142 beats per minute.  The placenta is posterior.  The amniotic fluid index is normal.   The composite gestational age is 26w5d.  The estimated fetal weight is at the 56th percentile.  Her cervical length is 4.3 cm without funneling.  Eccentric cord insertion.        PAST OBSTETRICAL HISTORY:    2010 -- 39 week  of 7lb 2oz male (Lisandra).  She denies having any other complications with the pregnancy, delivery, and/or postpartum recovery.  She reports that her son is living and well. Partner #1      -- Early SAB, no D&C, no complications, partner #2     2014 -- 39 week  of a 7lb 8oz male (Popeye).  She denies having any other complications with the pregnancy, delivery, and/or postpartum recovery.  She reports that her son is living and well. Partner #2     3/26/2016 -- 37 week  of a 6lb 12oz female (Ranjit).  She denies having any other complications with the pregnancy, delivery, and/or postpartum recovery.  She reports that her daughter

## 2024-03-27 PROBLEM — O99.340 ANXIETY DURING PREGNANCY: Status: ACTIVE | Noted: 2024-03-27

## 2024-03-27 PROBLEM — F41.9 ANXIETY DURING PREGNANCY: Status: ACTIVE | Noted: 2024-03-27

## 2024-03-27 PROBLEM — R82.90 ABNORMAL URINALYSIS: Status: ACTIVE | Noted: 2024-03-27

## 2024-06-18 ENCOUNTER — ANESTHESIA EVENT (OUTPATIENT)
Dept: LABOR AND DELIVERY | Age: 29
End: 2024-06-18
Payer: MEDICAID

## 2024-06-18 ENCOUNTER — ANESTHESIA (OUTPATIENT)
Dept: LABOR AND DELIVERY | Age: 29
End: 2024-06-18
Payer: MEDICAID

## 2024-06-18 ENCOUNTER — HOSPITAL ENCOUNTER (INPATIENT)
Age: 29
LOS: 1 days | Discharge: HOME | DRG: 560 | End: 2024-06-19
Attending: OBSTETRICS & GYNECOLOGY | Admitting: OBSTETRICS & GYNECOLOGY
Payer: MEDICAID

## 2024-06-18 PROBLEM — Z34.93 NORMAL PREGNANCY, THIRD TRIMESTER: Status: ACTIVE | Noted: 2024-06-18

## 2024-06-18 LAB
ABNORMAL SPECIMEN VALIDITY TEST: NORMAL
ABO + RH BLD: NORMAL
AMPHET UR QL SCN: NEGATIVE
ARM BAND NUMBER: NORMAL
BARBITURATES UR QL SCN: NEGATIVE
BENZODIAZ UR QL: NEGATIVE
BLOOD BANK SAMPLE EXPIRATION: NORMAL
BLOOD GROUP ANTIBODIES SERPL: NEGATIVE
BUPRENORPHINE UR QL: NEGATIVE
CANNABINOIDS UR QL SCN: POSITIVE
COCAINE UR QL SCN: NEGATIVE
ERYTHROCYTE [DISTWIDTH] IN BLOOD BY AUTOMATED COUNT: 15.7 % (ref 11.5–15)
FENTANYL UR QL: NEGATIVE
HCT VFR BLD AUTO: 38 % (ref 34–48)
HGB BLD-MCNC: 11.9 G/DL (ref 11.5–15.5)
HIV1+2 AB SERPLBLD QL IA.RAPID: NONREACTIVE
INTEGRITY CHECK, CREATININE, URINE: 45.3 MG/DL (ref 22–250)
INTEGRITY CHECK, OXIDANT, URINE: <40 MG/L
INTEGRITY CHECK, PH, URINE: 6.6 (ref 4.5–9)
INTEGRITY CHECK, SPECIFIC GRAVITY, URINE: 1.01 (ref 1–1.03)
MCH RBC QN AUTO: 21.8 PG (ref 26–35)
MCHC RBC AUTO-ENTMCNC: 31.3 G/DL (ref 32–34.5)
MCV RBC AUTO: 69.7 FL (ref 80–99.9)
METHADONE UR QL: NEGATIVE
OPIATES UR QL SCN: NEGATIVE
OXYCODONE UR QL SCN: NEGATIVE
PCP UR QL SCN: NEGATIVE
PLATELET # BLD AUTO: 305 K/UL (ref 130–450)
PMV BLD AUTO: 11 FL (ref 7–12)
RBC # BLD AUTO: 5.45 M/UL (ref 3.5–5.5)
TEST INFORMATION: ABNORMAL
WBC OTHER # BLD: 12.2 K/UL (ref 4.5–11.5)

## 2024-06-18 PROCEDURE — 6360000002 HC RX W HCPCS: Performed by: OBSTETRICS & GYNECOLOGY

## 2024-06-18 PROCEDURE — 6360000002 HC RX W HCPCS: Performed by: ANESTHESIOLOGY

## 2024-06-18 PROCEDURE — 51701 INSERT BLADDER CATHETER: CPT

## 2024-06-18 PROCEDURE — 88307 TISSUE EXAM BY PATHOLOGIST: CPT

## 2024-06-18 PROCEDURE — 6370000000 HC RX 637 (ALT 250 FOR IP): Performed by: OBSTETRICS & GYNECOLOGY

## 2024-06-18 PROCEDURE — 1220000000 HC SEMI PRIVATE OB R&B

## 2024-06-18 PROCEDURE — 86592 SYPHILIS TEST NON-TREP QUAL: CPT

## 2024-06-18 PROCEDURE — 86901 BLOOD TYPING SEROLOGIC RH(D): CPT

## 2024-06-18 PROCEDURE — 86735 MUMPS ANTIBODY: CPT

## 2024-06-18 PROCEDURE — 86765 RUBEOLA ANTIBODY: CPT

## 2024-06-18 PROCEDURE — 7200000001 HC VAGINAL DELIVERY

## 2024-06-18 PROCEDURE — 85027 COMPLETE CBC AUTOMATED: CPT

## 2024-06-18 PROCEDURE — 87340 HEPATITIS B SURFACE AG IA: CPT

## 2024-06-18 PROCEDURE — 99222 1ST HOSP IP/OBS MODERATE 55: CPT | Performed by: MIDWIFE

## 2024-06-18 PROCEDURE — 86900 BLOOD TYPING SEROLOGIC ABO: CPT

## 2024-06-18 PROCEDURE — 86701 HIV-1ANTIBODY: CPT

## 2024-06-18 PROCEDURE — 86850 RBC ANTIBODY SCREEN: CPT

## 2024-06-18 PROCEDURE — 36415 COLL VENOUS BLD VENIPUNCTURE: CPT

## 2024-06-18 PROCEDURE — 2500000003 HC RX 250 WO HCPCS

## 2024-06-18 PROCEDURE — G0480 DRUG TEST DEF 1-7 CLASSES: HCPCS

## 2024-06-18 PROCEDURE — 80307 DRUG TEST PRSMV CHEM ANLYZR: CPT

## 2024-06-18 PROCEDURE — 86762 RUBELLA ANTIBODY: CPT

## 2024-06-18 PROCEDURE — 2500000003 HC RX 250 WO HCPCS: Performed by: ANESTHESIOLOGY

## 2024-06-18 PROCEDURE — 2580000003 HC RX 258: Performed by: OBSTETRICS & GYNECOLOGY

## 2024-06-18 RX ORDER — SODIUM CHLORIDE 9 MG/ML
25 INJECTION, SOLUTION INTRAVENOUS PRN
Status: DISCONTINUED | OUTPATIENT
Start: 2024-06-18 | End: 2024-06-19 | Stop reason: HOSPADM

## 2024-06-18 RX ORDER — PENICILLIN G 3000000 [IU]/50ML
3 INJECTION, SOLUTION INTRAVENOUS EVERY 4 HOURS
Status: DISCONTINUED | OUTPATIENT
Start: 2024-06-18 | End: 2024-06-19 | Stop reason: HOSPADM

## 2024-06-18 RX ORDER — LIDOCAINE HYDROCHLORIDE 10 MG/ML
INJECTION, SOLUTION INFILTRATION; PERINEURAL
Status: DISPENSED
Start: 2024-06-18 | End: 2024-06-18

## 2024-06-18 RX ORDER — ONDANSETRON 2 MG/ML
4 INJECTION INTRAMUSCULAR; INTRAVENOUS EVERY 6 HOURS PRN
Status: DISCONTINUED | OUTPATIENT
Start: 2024-06-18 | End: 2024-06-19 | Stop reason: HOSPADM

## 2024-06-18 RX ORDER — MISOPROSTOL 200 UG/1
400 TABLET ORAL PRN
Status: DISCONTINUED | OUTPATIENT
Start: 2024-06-18 | End: 2024-06-19 | Stop reason: HOSPADM

## 2024-06-18 RX ORDER — ONDANSETRON 2 MG/ML
4 INJECTION INTRAMUSCULAR; INTRAVENOUS EVERY 6 HOURS PRN
Status: DISCONTINUED | OUTPATIENT
Start: 2024-06-18 | End: 2024-06-18 | Stop reason: SDUPTHER

## 2024-06-18 RX ORDER — IBUPROFEN 800 MG/1
800 TABLET ORAL EVERY 8 HOURS SCHEDULED
Status: DISCONTINUED | OUTPATIENT
Start: 2024-06-18 | End: 2024-06-19 | Stop reason: HOSPADM

## 2024-06-18 RX ORDER — ACETAMINOPHEN 500 MG
1000 TABLET ORAL EVERY 8 HOURS SCHEDULED
Status: DISCONTINUED | OUTPATIENT
Start: 2024-06-18 | End: 2024-06-18

## 2024-06-18 RX ORDER — METHYLERGONOVINE MALEATE 0.2 MG/ML
200 INJECTION INTRAVENOUS PRN
Status: DISCONTINUED | OUTPATIENT
Start: 2024-06-18 | End: 2024-06-19 | Stop reason: HOSPADM

## 2024-06-18 RX ORDER — SODIUM CHLORIDE 0.9 % (FLUSH) 0.9 %
5-40 SYRINGE (ML) INJECTION EVERY 12 HOURS SCHEDULED
Status: DISCONTINUED | OUTPATIENT
Start: 2024-06-18 | End: 2024-06-19 | Stop reason: HOSPADM

## 2024-06-18 RX ORDER — FERROUS SULFATE 325(65) MG
325 TABLET ORAL EVERY OTHER DAY
Status: DISCONTINUED | OUTPATIENT
Start: 2024-06-18 | End: 2024-06-19 | Stop reason: HOSPADM

## 2024-06-18 RX ORDER — PENICILLIN G POTASSIUM 5000000 [IU]/1
INJECTION, POWDER, FOR SOLUTION INTRAMUSCULAR; INTRAVENOUS
Status: DISPENSED
Start: 2024-06-18 | End: 2024-06-18

## 2024-06-18 RX ORDER — SODIUM CHLORIDE, SODIUM LACTATE, POTASSIUM CHLORIDE, CALCIUM CHLORIDE 600; 310; 30; 20 MG/100ML; MG/100ML; MG/100ML; MG/100ML
INJECTION, SOLUTION INTRAVENOUS CONTINUOUS
Status: DISCONTINUED | OUTPATIENT
Start: 2024-06-18 | End: 2024-06-19 | Stop reason: HOSPADM

## 2024-06-18 RX ORDER — MISOPROSTOL 200 UG/1
800 TABLET ORAL PRN
Status: DISCONTINUED | OUTPATIENT
Start: 2024-06-18 | End: 2024-06-19 | Stop reason: HOSPADM

## 2024-06-18 RX ORDER — CARBOPROST TROMETHAMINE 250 UG/ML
250 INJECTION, SOLUTION INTRAMUSCULAR PRN
Status: DISCONTINUED | OUTPATIENT
Start: 2024-06-18 | End: 2024-06-18 | Stop reason: SDUPTHER

## 2024-06-18 RX ORDER — KETOROLAC TROMETHAMINE 30 MG/ML
30 INJECTION, SOLUTION INTRAMUSCULAR; INTRAVENOUS EVERY 6 HOURS PRN
Status: DISCONTINUED | OUTPATIENT
Start: 2024-06-18 | End: 2024-06-19 | Stop reason: HOSPADM

## 2024-06-18 RX ORDER — CARBOPROST TROMETHAMINE 250 UG/ML
250 INJECTION, SOLUTION INTRAMUSCULAR PRN
Status: DISCONTINUED | OUTPATIENT
Start: 2024-06-18 | End: 2024-06-19 | Stop reason: HOSPADM

## 2024-06-18 RX ORDER — TRANEXAMIC ACID 10 MG/ML
1000 INJECTION, SOLUTION INTRAVENOUS
Status: ACTIVE | OUTPATIENT
Start: 2024-06-18 | End: 2024-06-19

## 2024-06-18 RX ORDER — ONDANSETRON 4 MG/1
4 TABLET, ORALLY DISINTEGRATING ORAL EVERY 6 HOURS PRN
Status: DISCONTINUED | OUTPATIENT
Start: 2024-06-18 | End: 2024-06-19 | Stop reason: HOSPADM

## 2024-06-18 RX ORDER — LIDOCAINE HYDROCHLORIDE AND EPINEPHRINE 15; 5 MG/ML; UG/ML
INJECTION, SOLUTION EPIDURAL PRN
Status: DISCONTINUED | OUTPATIENT
Start: 2024-06-18 | End: 2024-06-18 | Stop reason: SDUPTHER

## 2024-06-18 RX ORDER — SODIUM CHLORIDE, SODIUM LACTATE, POTASSIUM CHLORIDE, AND CALCIUM CHLORIDE .6; .31; .03; .02 G/100ML; G/100ML; G/100ML; G/100ML
500 INJECTION, SOLUTION INTRAVENOUS PRN
Status: DISCONTINUED | OUTPATIENT
Start: 2024-06-18 | End: 2024-06-19 | Stop reason: HOSPADM

## 2024-06-18 RX ORDER — METHYLERGONOVINE MALEATE 0.2 MG/ML
200 INJECTION INTRAVENOUS PRN
Status: DISCONTINUED | OUTPATIENT
Start: 2024-06-18 | End: 2024-06-18 | Stop reason: SDUPTHER

## 2024-06-18 RX ORDER — DEXTROSE MONOHYDRATE 50 MG/ML
INJECTION, SOLUTION INTRAVENOUS
Status: DISPENSED
Start: 2024-06-18 | End: 2024-06-18

## 2024-06-18 RX ORDER — ACETAMINOPHEN 650 MG
TABLET, EXTENDED RELEASE ORAL
Status: DISPENSED
Start: 2024-06-18 | End: 2024-06-18

## 2024-06-18 RX ORDER — NALOXONE HYDROCHLORIDE 0.4 MG/ML
INJECTION, SOLUTION INTRAMUSCULAR; INTRAVENOUS; SUBCUTANEOUS PRN
Status: DISCONTINUED | OUTPATIENT
Start: 2024-06-18 | End: 2024-06-19 | Stop reason: HOSPADM

## 2024-06-18 RX ORDER — SODIUM CHLORIDE 9 MG/ML
INJECTION, SOLUTION INTRAVENOUS PRN
Status: DISCONTINUED | OUTPATIENT
Start: 2024-06-18 | End: 2024-06-19 | Stop reason: HOSPADM

## 2024-06-18 RX ORDER — SODIUM CHLORIDE 0.9 % (FLUSH) 0.9 %
5-40 SYRINGE (ML) INJECTION PRN
Status: DISCONTINUED | OUTPATIENT
Start: 2024-06-18 | End: 2024-06-19 | Stop reason: HOSPADM

## 2024-06-18 RX ORDER — TRANEXAMIC ACID 10 MG/ML
1000 INJECTION, SOLUTION INTRAVENOUS
Status: DISCONTINUED | OUTPATIENT
Start: 2024-06-18 | End: 2024-06-18 | Stop reason: SDUPTHER

## 2024-06-18 RX ORDER — DOCUSATE SODIUM 100 MG/1
100 CAPSULE, LIQUID FILLED ORAL 2 TIMES DAILY
Status: DISCONTINUED | OUTPATIENT
Start: 2024-06-18 | End: 2024-06-19 | Stop reason: HOSPADM

## 2024-06-18 RX ORDER — MODIFIED LANOLIN
OINTMENT (GRAM) TOPICAL PRN
Status: DISCONTINUED | OUTPATIENT
Start: 2024-06-18 | End: 2024-06-19 | Stop reason: HOSPADM

## 2024-06-18 RX ADMIN — DOCUSATE SODIUM 100 MG: 100 CAPSULE, LIQUID FILLED ORAL at 21:11

## 2024-06-18 RX ADMIN — Medication 166.7 ML: at 05:03

## 2024-06-18 RX ADMIN — IBUPROFEN 800 MG: 800 TABLET, FILM COATED ORAL at 06:22

## 2024-06-18 RX ADMIN — ONDANSETRON 4 MG: 2 INJECTION INTRAMUSCULAR; INTRAVENOUS at 03:51

## 2024-06-18 RX ADMIN — Medication 15 ML/HR: at 03:15

## 2024-06-18 RX ADMIN — Medication: at 06:22

## 2024-06-18 RX ADMIN — DEXTROSE MONOHYDRATE 5 MILLION UNITS: 50 INJECTION, SOLUTION INTRAVENOUS at 02:28

## 2024-06-18 RX ADMIN — Medication 87.3 MILLI-UNITS/MIN: at 05:14

## 2024-06-18 RX ADMIN — IBUPROFEN 800 MG: 800 TABLET, FILM COATED ORAL at 12:54

## 2024-06-18 RX ADMIN — SODIUM CHLORIDE, POTASSIUM CHLORIDE, SODIUM LACTATE AND CALCIUM CHLORIDE: 600; 310; 30; 20 INJECTION, SOLUTION INTRAVENOUS at 02:18

## 2024-06-18 RX ADMIN — KETOROLAC TROMETHAMINE 30 MG: 30 INJECTION INTRAMUSCULAR; INTRAVENOUS at 21:10

## 2024-06-18 RX ADMIN — LIDOCAINE HYDROCHLORIDE,EPINEPHRINE BITARTRATE 1 ML: 15; .005 INJECTION, SOLUTION EPIDURAL; INFILTRATION; INTRACAUDAL; PERINEURAL at 03:09

## 2024-06-18 ASSESSMENT — PAIN DESCRIPTION - ORIENTATION: ORIENTATION: LOWER

## 2024-06-18 ASSESSMENT — PAIN DESCRIPTION - LOCATION
LOCATION: ABDOMEN
LOCATION: ABDOMEN;VAGINA
LOCATION: ABDOMEN;VAGINA;PERINEUM

## 2024-06-18 ASSESSMENT — PAIN - FUNCTIONAL ASSESSMENT: PAIN_FUNCTIONAL_ASSESSMENT: ACTIVITIES ARE NOT PREVENTED

## 2024-06-18 ASSESSMENT — PAIN SCALES - GENERAL
PAINLEVEL_OUTOF10: 8
PAINLEVEL_OUTOF10: 8
PAINLEVEL_OUTOF10: 7

## 2024-06-18 ASSESSMENT — LIFESTYLE VARIABLES: SMOKING_STATUS: 1

## 2024-06-18 ASSESSMENT — PAIN DESCRIPTION - DESCRIPTORS
DESCRIPTORS: ACHING;CRAMPING;DISCOMFORT
DESCRIPTORS: CRAMPING;SORE

## 2024-06-18 NOTE — ANESTHESIA POSTPROCEDURE EVALUATION
Department of Anesthesiology  Postprocedure Note    Patient: Renee Mahmood  MRN: [id number]  YOB: 1995  Date of evaluation: 6/18/2024    Procedure Summary       Date: 06/18/24 Room / Location:     Anesthesia Start: 0257 Anesthesia Stop: 0510    Procedure: Labor Analgesia Diagnosis:     Scheduled Providers:  Responsible Provider: Isreal Silva DO    Anesthesia Type: epidural, spinal, general ASA Status: 2            Anesthesia Type: No value filed.    Anabell Phase I:      Anabell Phase II:      Anesthesia Post Evaluation    Patient location during evaluation: bedside  Patient participation: complete - patient participated  Level of consciousness: awake and alert  Pain score: 0  Airway patency: patent  Nausea & Vomiting: no vomiting and no nausea  Cardiovascular status: hemodynamically stable  Respiratory status: acceptable and room air  Hydration status: stable  Pain management: adequate and satisfactory to patient        No notable events documented.

## 2024-06-18 NOTE — PROGRESS NOTES
Patient admitted to room 313. Oriented to room and floor. Admission paperwork, PPD, [place] discharge instructions given and reviewed. Safe sleep and infant safety discussed with patient. Patient verbalizes understanding. Patient states she would like the tdap vaccine before discharge. Call light within reach.

## 2024-06-18 NOTE — H&P
Department of Obstetrics and Gynecology  Nurse Practitioner Obstetrics History and Physical        CHIEF COMPLAINT:  contractions    HISTORY OF PRESENT ILLNESS:  Renee Mahmood is a 28 y.o. female , Patient's last menstrual period was 2023.,  at 39w1d.     Presents to L&D with contractions all day, stronger since 10:30pm.  Denies bleeding or LOF.  Pregnancy complicated by grandmultiparity, alpha-thallasemia trait,  anemia, eccentric cord insertion.         OB History          9    Para   7    Term   7            AB   1    Living   7         SAB   1    IAB        Ectopic        Molar        Multiple   0    Live Births   7                Estimated Due Date: Estimated Date of Delivery: 24      Pregnancy complicated by:   Patient Active Problem List   Diagnosis Code    Grand multipara Z64.1    Anemia D64.9    Depression affecting pregnancy O99.340, F32.A    BMI 35.0-35.9,adult Z68.35    35 weeks gestation of pregnancy Z3A.35    Alpha thalassemia trait D56.3    Grand multipara in labor O80    26 weeks gestation of pregnancy Z3A.26    Abnormal urinalysis R82.90    Anxiety during pregnancy O99.340, F41.9    Normal pregnancy, third trimester Z34.93           PAST OB HISTORY  OB History          9    Para   7    Term   7            AB   1    Living   7         SAB   1    IAB        Ectopic        Molar        Multiple   0    Live Births   7                  Past Medical History:          Diagnosis Date    Abnormal Pap smear of cervix     most recent pap smear was abnormal, pt states due to trich and BV    Alpha thalassemia trait     Anemia     Asthma     Mental disorder     depression and anxiety - stopped taking medication in     Postpartum depression     STD (sexually transmitted disease)     chlamydia in 10/2022 - treated, tested positive for trich    Trauma     in        Past Surgical History:          Procedure Laterality Date    CHOLECYSTECTOMY  2017

## 2024-06-18 NOTE — ANESTHESIA PRE PROCEDURE
Department of Anesthesiology  Preprocedure Note       Name:  Renee Mahmood   Age:  28 y.o.  :  1995                                          MRN:  [id number]         Date:  2024      Surgeon: * No surgeons listed *    Procedure: * No procedures listed *    Medications prior to admission:   Prior to Admission medications    Medication Sig Start Date End Date Taking? Authorizing Provider   Prenatal Vit-Fe Fumarate-FA (PRENATAL VITAMINS) 28-0.8 MG TABS Take 1 tablet by mouth daily 24   Provider, MD Nirmal   sertraline (ZOLOFT) 50 MG tablet Take 1 tablet by mouth daily 3/19/24   Lori Jacob MD   lansinoh lanolin CREA ointment Apply 1 Tube topically as needed for Dry Skin (nipple discomfort)  Patient not taking: Reported on 3/19/2024 7/25/23   Yovani Garber MD   Prenatal MV-Min-FA-Omega-3 (PRENATAL GUMMIES/DHA & FA) 0.4-32.5 MG CHEW Take 1 tablet by mouth daily  Patient not taking: Reported on 3/19/2024 3/21/19   Nallely Araujo, APRN - CNP       Current medications:    Current Facility-Administered Medications   Medication Dose Route Frequency Provider Last Rate Last Admin    lactated ringers IV soln infusion   IntraVENous Continuous Yovani Garber  mL/hr at 24 0218 New Bag at 24 0218    lactated ringers bolus 500 mL  500 mL IntraVENous PRN Yovani Garber MD        Or    lactated ringers bolus 500 mL  500 mL IntraVENous PRN Yovani Garber MD        sodium chloride flush 0.9 % injection 5-40 mL  5-40 mL IntraVENous 2 times per day Yovani Garber MD        sodium chloride flush 0.9 % injection 5-40 mL  5-40 mL IntraVENous PRN Yovani Garber MD        0.9 % sodium chloride infusion  25 mL IntraVENous PRN Yovani Garber MD        methylergonovine (METHERGINE) injection 200 mcg  200 mcg IntraMUSCular PRN Yovani Garber MD        carboprost (HEMABATE) injection 250 mcg  250 mcg IntraMUSCular PRN Yovani Garber MD        miSOPROStol (CYTOTEC) tablet 400 mcg  400 mcg

## 2024-06-18 NOTE — PROGRESS NOTES
Pt presents to unit via squad for ctx every 2-5 minutes. State she has been having cxt all day but they became regular and more intense around 2230. Denies lof of vb. Reports +fm. Pt unsure gbs status.

## 2024-06-18 NOTE — PROCEDURES
Patient Name: Renee Mahmood   Medical Record Number: [id number]  Date: 2024   Time: 5:21 AM   Room/Bed: LD09/LD09-A  Vaginal delivery Procedure Note  Indication: Intrauterine pregnancy at 39 weeks and 1 day in labor    Consent: The patient was counseled regarding the procedure, its indications, risks, potential complications and alternatives, and any questions were answered. Consent was obtained to proceed.    Procedure: The patient was placed in the dorsal lithotomy position. Anesthesia none. The infant was delivered in the occiput posterior position by spontaneous vaginal delivery.  Shoulder dystocia occurred and was relieved with the aid of suprapubic pressure Alta maneuver and delivery of the posterior shoulder.  A nuchal cord was not present. An episiotomy was not needed. The umbilical cord was double clamped and cut. The infant was placed under a warmer. The placenta was delivered with gentle traction and was noted to be intact and whole. Estimated blood loss was 50 ml.    Time of Birth (): 0510 hours    Infant's Apgar Score at 1 Minute: 8, at 5 minutes: 9  Additional items performed: Pitocin, 30 milliunits in 500 mL of ringers lactate was administered, wide open, to assist with uterine contraction  The patient tolerated the procedure well.  Complications: shoulder dystocia  Electronically Signed by: @shun@

## 2024-06-18 NOTE — CARE COORDINATION
SW Discharge Planning   SW received consult for \" UDS positive for Marijuana. Limited prenatal care \"     NELLY met with Renee Mahmood ( 6/18/24) ( [phone]) mother to baby boy Sincere Silva . Renee reported father to be Philippe Ascencio ( 8/18/93)  and reported that she resides with him and her children,  Lisandra Nielsen (2/17/10) , Popeye Hudson II (8/25/14) Taz Hudson (3/26/16) Nick Hudson (3/11/17) Tasneem Hudson ( 5/19/18) Florycarmelo Hudson ( 5/22/19) and .Philippe Ascencio Jr (7/24/23) Renee reported that both she and Philippe are currently unemployed and that baby will be added to their [employer] insurance. Renee reported having all needed infant items for baby including a bassinet and car seat. Per Coco prenatal care was with Dr. Garber and she is still deciding on a pediatrician. Renee denied any current or past history of domestic violence or legal history. Renee did report having anxiety and depression and is involved with [employer]. Per Renee, CSB has been previously involved, once when her son  Lisandra left the house and walked to school by himself at age 6, and when Nick Ruth & Philippe Verdugo was born due to THC usage during pregnancy. Renee stated and stated understanding for he need for a [facility] ( [phone])  referral.      NELLY completed [facility] referral to Germania Suggs in intake ( [phone]).     PLAN     Baby can NOT be discharged home until [facility] provides disposition  SW to continue communication with [facility] ( [phone])   Electronically signed by JUAN FRANCISCO Posada on 6/18/2024 at 3:38 PM

## 2024-06-18 NOTE — ANESTHESIA PROCEDURE NOTES
CSE Block    Patient location during procedure: OB  Reason for block: labor epidural  Staffing  Performed: resident/CRNA   Resident/CRNA: Manuel Caceres APRN - JOSÉ  Performed by: Manuel Caceres APRN - CRNA  Authorized by: Isreal Silva DO    CSE  Patient position: sitting  Prep: ChloraPrep  Patient monitoring: continuous pulse ox and frequent blood pressure checks  Approach: midline  Provider prep: mask and sterile gloves  Spinal Needle  Needle type: pencil-tip   Needle gauge: 27 G  Needle length: 5 inch.  Epidural Needle  Injection technique: BENJAMIN air  Needle type: Tuohy   Needle gauge: 18 G  Needle length: 3.5 in  Needle insertion depth: 7 cm  Location: lumbar (1-5)  Catheter  Catheter type: end hole  Catheter size: 29g.  Catheter at skin depth: 14 cm  Test dose: negative  Assessment  Hemodynamics: stable  Preanesthetic Checklist  Completed: patient identified, IV checked, site marked, risks and benefits discussed, surgical/procedural consents, equipment checked, pre-op evaluation, timeout performed, anesthesia consent given, oxygen available, monitors applied/VS acknowledged, fire risk safety assessment completed and verbalized and blood product R/B/A discussed and consented

## 2024-06-19 VITALS
RESPIRATION RATE: 18 BRPM | OXYGEN SATURATION: 98 % | TEMPERATURE: 97.3 F | BODY MASS INDEX: 34.96 KG/M2 | DIASTOLIC BLOOD PRESSURE: 67 MMHG | HEIGHT: 62 IN | WEIGHT: 190 LBS | SYSTOLIC BLOOD PRESSURE: 111 MMHG | HEART RATE: 85 BPM

## 2024-06-19 LAB
COMPLIANCE DRUG ANALYSIS, URINE: NORMAL
ERYTHROCYTE [DISTWIDTH] IN BLOOD BY AUTOMATED COUNT: 15.6 % (ref 11.5–15)
HBV SURFACE AG SERPL QL IA: NONREACTIVE
HCT VFR BLD AUTO: 35.8 % (ref 34–48)
HGB BLD-MCNC: 11 G/DL (ref 11.5–15.5)
MCH RBC QN AUTO: 21.4 PG (ref 26–35)
MCHC RBC AUTO-ENTMCNC: 30.7 G/DL (ref 32–34.5)
MCV RBC AUTO: 69.8 FL (ref 80–99.9)
PLATELET # BLD AUTO: 272 K/UL (ref 130–450)
PMV BLD AUTO: 11.1 FL (ref 7–12)
RBC # BLD AUTO: 5.13 M/UL (ref 3.5–5.5)
RPR SER QL: NONREACTIVE
THC NORMALIZED, QUANTITIATIVE, URINE: 51.9 NG/ML
THC-COOH, QUANTITATIVE, URINE: 23.5 NG/ML
WBC OTHER # BLD: 13.9 K/UL (ref 4.5–11.5)

## 2024-06-19 PROCEDURE — 6370000000 HC RX 637 (ALT 250 FOR IP): Performed by: OBSTETRICS & GYNECOLOGY

## 2024-06-19 PROCEDURE — 90471 IMMUNIZATION ADMIN: CPT | Performed by: OBSTETRICS & GYNECOLOGY

## 2024-06-19 PROCEDURE — 90715 TDAP VACCINE 7 YRS/> IM: CPT | Performed by: OBSTETRICS & GYNECOLOGY

## 2024-06-19 PROCEDURE — 85027 COMPLETE CBC AUTOMATED: CPT

## 2024-06-19 PROCEDURE — 6360000002 HC RX W HCPCS: Performed by: OBSTETRICS & GYNECOLOGY

## 2024-06-19 RX ORDER — MODIFIED LANOLIN
1 OINTMENT (GRAM) TOPICAL PRN
Qty: 1 EACH | Refills: 3 | Status: SHIPPED | OUTPATIENT
Start: 2024-06-19

## 2024-06-19 RX ORDER — DIPHENHYDRAMINE HCL 25 MG
25 TABLET ORAL EVERY 6 HOURS PRN
Status: DISCONTINUED | OUTPATIENT
Start: 2024-06-19 | End: 2024-06-19 | Stop reason: HOSPADM

## 2024-06-19 RX ORDER — IBUPROFEN 800 MG/1
800 TABLET ORAL EVERY 8 HOURS PRN
Qty: 60 TABLET | Refills: 1 | Status: SHIPPED | OUTPATIENT
Start: 2024-06-19

## 2024-06-19 RX ADMIN — DIPHENHYDRAMINE HCL 25 MG: 25 TABLET ORAL at 08:35

## 2024-06-19 RX ADMIN — TETANUS TOXOID, REDUCED DIPHTHERIA TOXOID AND ACELLULAR PERTUSSIS VACCINE, ADSORBED 0.5 ML: 5; 2.5; 8; 8; 2.5 SUSPENSION INTRAMUSCULAR at 18:16

## 2024-06-19 RX ADMIN — KETOROLAC TROMETHAMINE 30 MG: 30 INJECTION INTRAMUSCULAR; INTRAVENOUS at 05:42

## 2024-06-19 RX ADMIN — DOCUSATE SODIUM 100 MG: 100 CAPSULE, LIQUID FILLED ORAL at 08:35

## 2024-06-19 ASSESSMENT — PAIN SCALES - GENERAL: PAINLEVEL_OUTOF10: 6

## 2024-06-19 ASSESSMENT — PAIN DESCRIPTION - ORIENTATION: ORIENTATION: LOWER;LEFT;RIGHT

## 2024-06-19 ASSESSMENT — PAIN DESCRIPTION - DESCRIPTORS: DESCRIPTORS: CRAMPING;ACHING

## 2024-06-19 ASSESSMENT — PAIN DESCRIPTION - LOCATION: LOCATION: ABDOMEN;LEG

## 2024-06-19 ASSESSMENT — PAIN - FUNCTIONAL ASSESSMENT: PAIN_FUNCTIONAL_ASSESSMENT: ACTIVITIES ARE NOT PREVENTED

## 2024-06-19 NOTE — FLOWSHEET NOTE
Dr. Garber returned call and notified that patient's rubella status is still pending and that this RN instructed her to follow up with Dr. Garber regarding obtaining an MMR if needed. Also asked Dr. Garber for a Tdap order as patient is requesting Tdap vaccination prior to discharge.

## 2024-06-19 NOTE — FLOWSHEET NOTE
This RN called Dr. Garber's and updated him on patient's complaints of scratchy throat, runny nose, and request for Benadryl. New orders received for Benadryl 25 mg po q 6 hours prn

## 2024-06-19 NOTE — LACTATION NOTE
Grand multiparous mom breast fed for as long as six months.  Assisted with positioning and latch on the right breast in cradle hold.  Baby latches well.  Discussed frequency and duration of breastfeeds and signs of adequate milk transfer. Encouraged mom to hand express drops of colostrum at the start of a  breastfeed.  Recommended to avoid a pacifier for three weeks or until breastfeeding is well established.  Mom has an electric breast pump for home.  Went over breastfeeding resources.  Encouraged mom to call us with questions or concerns or for assistance.  
Mom is sleeping will return later.  
This note was copied from a baby's chart.  Encouraged frequent feeds at breast, hand expression and skin to skin to establish supply. Support provided and encouraged to call with any needs.     Ml French, CLS  
No

## 2024-06-19 NOTE — FLOWSHEET NOTE
Patient given prescriptions for lanolin cream and ibuprofen and instructed her to fill them at the pharmacy of her choice with verbalized understanding.

## 2024-06-19 NOTE — PROGRESS NOTES
[employer] [place] Hearing screening results were discussed with parent. Questions answered. Brochure given to parent. Advised to monitor developmental milestones and contact physician for any concerns.   Tran Mckeon, AuD

## 2024-06-19 NOTE — PROGRESS NOTES
Department of Obstetrics and Gynecology  Labor and Delivery  Attending Post Partum Progress Note      SUBJECTIVE:  Patient without complaints  OBJECTIVE:      Vitals:  BP 85/59  Pulse 83  Temp(Src) 97.4 °F (36.3 °C) (Oral)  Resp 18  Ht 5' 4\" (1.626 m)  Wt 160 lb (72.576 kg)  BMI 27.46 kg/m2  Breastfeeding?Yes    CONSTITUTIONAL:  awake, alert, cooperative, no apparent distress, and appears stated age  ABDOMEN:  Fundus firm and 1 below the umbilicus  CHEST/BREASTS:  Breasts symmetrical,soft and non-tender  MUSCULOSKELETAL: No calf tenderness, 1+ pedal edema.     DATA:    RH:  No results found for: \"ANATITER\", \"C3\", \"C4\", \"RF\"  Antibody Screen:  No components found for: \"ABSCINT\"  Urine Culture Screen:  No components found for: \"CURINE\"  CBC:    Lab Results   Component Value Date/Time    WBC 13.9 06/19/2024 05:39 AM    RBC 5.13 06/19/2024 05:39 AM    HGB 11.0 06/19/2024 05:39 AM    HCT 35.8 06/19/2024 05:39 AM    MCV 69.8 06/19/2024 05:39 AM    RDW 15.6 06/19/2024 05:39 AM     06/19/2024 05:39 AM     U/A:  No components found for: \"UCOLOR\", \"UCLARITY\", \"USPGRAV\", \"UPH\", \"UPROTEIN\", \"UGLUCOSE\", \"UKETONE\", \"UBILI\", \"UBLOOD\", \"UNITRITE\", \"UUROBIL\", \"ULEUKEST\", \"USQEPI\", \"URENEPI\", \"UWBC\", \"URBC\", \"UBACTERIA\", \"UHYALINE\"    ASSESSMENT & PLAN:        Assessment: PP#1 doing well    Plan: Patient is for discharge to home today    Yovani Garber MD  4:55 PM

## 2024-06-19 NOTE — FLOWSHEET NOTE
Patient given a copy of her discharge instructions/AVS for her records. Final ID band check completed with patient and infant. Correct ID confirmed. Hugs tag disabled and removed.

## 2024-06-19 NOTE — FLOWSHEET NOTE
Patient completed [facility] Mother (postpartum patient) discharge education videos and signed and dated form. Form filed in patient's chart.   [Alert] : alert [No Acute Distress] : no acute distress [Normocephalic] : normocephalic [EOMI Bilateral] : EOMI bilateral [Clear tympanic membranes with bony landmarks and light reflex present bilaterally] : clear tympanic membranes with bony landmarks and light reflex present bilaterally  [Pink Nasal Mucosa] : pink nasal mucosa [Nonerythematous Oropharynx] : nonerythematous oropharynx [Supple, full passive range of motion] : supple, full passive range of motion [No Palpable Masses] : no palpable masses [Clear to Ausculatation Bilaterally] : clear to auscultation bilaterally [Regular Rate and Rhythm] : regular rate and rhythm [Normal S1, S2 audible] : normal S1, S2 audible [No Murmurs] : no murmurs [+2 Femoral Pulses] : +2 femoral pulses [Soft] : soft [NonTender] : non tender [Non Distended] : non distended [Normoactive Bowel Sounds] : normoactive bowel sounds [No Hepatomegaly] : no hepatomegaly [No Abnormal Lymph Nodes Palpated] : no abnormal lymph nodes palpated [No Splenomegaly] : no splenomegaly [Normal Muscle Tone] : normal muscle tone [Straight] : straight [No Scoliosis] : no scoliosis [No Rash or Lesions] : no rash or lesions

## 2024-06-19 NOTE — FLOWSHEET NOTE
Dr. Garber called and notified of patient's PPD score of 10, that patient takes Zoloft 50 mg po qd, that she was given a postpartum depression informational packet, and that patient plans to follow up with a therapist postpartum.

## 2024-06-19 NOTE — DISCHARGE INSTRUCTIONS
Follow-up with your OB doctor in 6 weeks for vaginal delivery unless otherwise instructed.   Call office for an appointment.    For breastfeeding support, you can contact our lactation specialists at [phone] or [phone]    DIET  Eat a well balanced diet focusing on foods high in fiber and protein  Drink plenty of fluids especially water.  To avoid constipation you may take a mild stool softener as recommended by your doctor or midwife.    ACTIVITY  Gradually increase your activity.  Resume exercise regimen only after advised by your doctor or midwife.  Avoid lifting anything heavier than your baby or a gallon of milk for SIX weeks.   Avoid driving until your doctor or midwife has given their approval.  Rise slowly from a lying to sitting and then a standing position.  Climb stairs one at a time.  Use caution when carrying your baby up and down the stairs.  No sexual activity for 6 weeks or until advised by your doctor - Nothing in vagina: intercourse, tampons, or douching.   Be prepared to discuss family planning at your follow-up OB visit.   You may feel tired or have a lack of energy.  You may continue your prenatal vitamin to replenish nutrients post delivery.  Nap when baby naps to catch up on sleep.  May return to work or school in 6 weeks or as directed by OB.     EMOTIONS  You may feed pedro, sad, teary, & overwhelmed.  Contact your OB provider if you feel you may be showing signs of postpartum depression, or have thoughts of harming yourself or your infant.  If infant will not stop crying, contact another adult for help or place infant in their crib on their back and take a break.  NEVER shake your infant.      BLEEDING  Vaginal bleeding will decrease in amount over the next few weeks.  You will notice that as your activity increases, your flow may increase.  This is your body's way of telling you, you need to take things easier and rest more often.  Call your OB/ER if you are saturating more than  one maxi pad in an hour.    BREAST CARE  Take medications as recommended by your doctor or midwife for pain  If you develop a warm, red, tender area on your breast or develop a fever contact your OB provider.    For breastfeeding moms:  If you become engorged, feeding may be more difficult or painful for 1-2 days.  You may find it helpful to hand express some milk so that the infant can latch on more easily.   While breastfeeding, continue to take your prenatal vitamins as directed by your doctor or midwife.   Only take medications verified as safe for breastfeeding.    For non-breastfeeding moms:  You may apply ice packs to your breasts over your bra for twenty minutes at a time for comfort.  Avoid stimulation to your breasts, when showering allow the water to strike your back not your breasts.    Wear a good fitting bra until your milk dries, such as a sports bra.    INCISIONAL CARE / KRISTI CARE  Clean your incision in the shower with mild soap.  After shower pat the incision area dry and leave open to air.  If used, Steri-stipes should be removed by 2 weeks.  If used, Staples should be removed by the OB in office by 1 week.  If used/ordered, an abdominal binder may provide support for your incision.   Use the kristi-bottle after toileting until bleeding stops.  Cleanse your perineum from front to back  If used, stitches or internal clips will dissolve in 4-6 weeks.  You may use a sitz bath or soak in a clean tub as needed for comfort.  Kegel exercises will help restore bladder control.     SWELLING  Keep your legs elevated when sitting or lying.   When wearing stocking or socks, make sure they are not too tight.    WHEN TO CALL THE DOCTOR  If you have a temp of 100.4 or more.   If your bleeding has increased and you are saturating a pad in an hour.  Your abdomen is tender to touch.  You are passing blood clots bigger than the size of a lemon.  If you are experiencing extreme weakness or dizziness.   If you are

## 2024-06-19 NOTE — CARE COORDINATION
SW Discharge Planning     Per [facility] ( [phone]) supervisor, Darlene Dietz, [facility] ( [phone]) will NOT be involved at this time     PLAN    Baby CAN be discharged home when medically ready, children services will NOT be involved at this time.      Electronically signed by JUAN FRANCISCO Posada on 6/19/2024 at 7:51 AM

## 2024-06-19 NOTE — DISCHARGE SUMMARY
Obstetrical Discharge Form    Primary OB Clinician: JURGEN Hoyos,  FACOG  Postpartum 1 Day #    Gestational Age at time of delivery: 39 weeks and 1 day    Date of Delivery: 2024; Time of Delivery: 0510    Delivery Type: spontaneous vaginal delivery    Baby: Liveborn male,     Intrapartum complications: Shoulder dystocia    Laceration: none    Episiotomy: none,    Feeding method: both breast and bottle - Similac with iron    Rh Immune globulin given: no    Rubella vaccine given: no    Discharge Date: 2024; Discharge Time: 165    Early Discharge:  YES-Maternal- home visit declined by patient.  Condition at time of discharge home is good as well as disposition    Plan:     Follow-up appointment with Dr. Garber in 6 weeks.

## 2024-06-19 NOTE — FLOWSHEET NOTE
Dr. Garber paged through answering service to request an order for Tdap per patient's request prior to discharge and to notify him that patient's rubella status is still pending at this time.

## 2024-06-19 NOTE — PLAN OF CARE
Problem: Postpartum  Goal: Experiences normal postpartum course  Description:  Postpartum OB-Pregnancy care plan goal which identifies if the mother is experiencing a normal postpartum course  2024 by Lizy Tello RN  Outcome: Completed     Problem: Postpartum  Goal: Appropriate maternal -  bonding  Description:  Postpartum OB-Pregnancy care plan goal which identifies if the mother and  are bonding appropriately  2024 by Lizy Tello RN  Outcome: Completed     Problem: Postpartum  Goal: Establishment of infant feeding pattern  Description:  Postpartum OB-Pregnancy care plan goal which identifies if the mother is establishing a feeding pattern with their   2024 by Lizy Tello RN  Outcome: Completed     Problem: Postpartum  Goal: Incisions, wounds, or drain sites healing without S/S of infection  2024 by Lizy Tello RN  Outcome: Completed     Problem: Pain  Goal: Verbalizes/displays adequate comfort level or baseline comfort level  2024 by Lizy Tello RN  Outcome: Completed     Problem: Infection - Adult  Goal: Absence of infection at discharge  2024 by Lizy Tello RN  Outcome: Completed     Problem: Infection - Adult  Goal: Absence of infection during hospitalization  2024 by Lizy Tello RN  Outcome: Completed     Problem: Infection - Adult  Goal: Absence of fever/infection during anticipated neutropenic period  2024 by Lizy Tello RN  Outcome: Completed     Problem: Safety - Adult  Goal: Free from fall injury  2024 by Lizy Tello RN  Outcome: Completed     Problem: Discharge Planning  Goal: Discharge to home or other facility with appropriate resources  2024 by Lizy Tello RN  Outcome: Completed

## 2024-06-19 NOTE — PLAN OF CARE
Problem: Postpartum  Goal: Experiences normal postpartum course  Description:  Postpartum OB-Pregnancy care plan goal which identifies if the mother is experiencing a normal postpartum course  Outcome: Progressing     Problem: Postpartum  Goal: Appropriate maternal -  bonding  Description:  Postpartum OB-Pregnancy care plan goal which identifies if the mother and  are bonding appropriately  Outcome: Progressing     Problem: Postpartum  Goal: Establishment of infant feeding pattern  Description:  Postpartum OB-Pregnancy care plan goal which identifies if the mother is establishing a feeding pattern with their   Outcome: Progressing     Problem: Postpartum  Goal: Incisions, wounds, or drain sites healing without S/S of infection  Outcome: Progressing     Problem: Pain  Goal: Verbalizes/displays adequate comfort level or baseline comfort level  Outcome: Progressing  Flowsheets (Taken 2024 6610)  Verbalizes/displays adequate comfort level or baseline comfort level:   Encourage patient to monitor pain and request assistance   Assess pain using appropriate pain scale   Administer analgesics based on type and severity of pain and evaluate response   Implement non-pharmacological measures as appropriate and evaluate response   Consider cultural and social influences on pain and pain management   Notify Licensed Independent Practitioner if interventions unsuccessful or patient reports new pain     Problem: Infection - Adult  Goal: Absence of infection at discharge  Outcome: Progressing     Problem: Infection - Adult  Goal: Absence of infection during hospitalization  Outcome: Progressing     Problem: Infection - Adult  Goal: Absence of fever/infection during anticipated neutropenic period  Outcome: Progressing     Problem: Safety - Adult  Goal: Free from fall injury  Outcome: Progressing     Problem: Discharge Planning  Goal: Discharge to home or other facility with appropriate resources  Outcome:

## 2024-06-20 LAB
MEV IGG SER-ACNC: NORMAL
MUV IGG SER IA-ACNC: NORMAL
RUBV IGG SERPL QL IA: NORMAL IU/ML

## 2024-06-26 LAB — SURGICAL PATHOLOGY REPORT: NORMAL

## 2025-01-23 NOTE — ED NOTES
Bed: 22  Expected date:   Expected time:   Means of arrival:   Comments:  Room 92 [facility]  06/14/19 [id number] Spouse